# Patient Record
Sex: FEMALE | Race: WHITE | NOT HISPANIC OR LATINO | Employment: OTHER | URBAN - METROPOLITAN AREA
[De-identification: names, ages, dates, MRNs, and addresses within clinical notes are randomized per-mention and may not be internally consistent; named-entity substitution may affect disease eponyms.]

---

## 2018-04-03 ENCOUNTER — OFFICE VISIT - RIVER FALLS (OUTPATIENT)
Dept: FAMILY MEDICINE | Facility: CLINIC | Age: 53
End: 2018-04-03

## 2018-04-25 ENCOUNTER — OFFICE VISIT - RIVER FALLS (OUTPATIENT)
Dept: FAMILY MEDICINE | Facility: CLINIC | Age: 53
End: 2018-04-25

## 2018-04-26 ENCOUNTER — OFFICE VISIT - RIVER FALLS (OUTPATIENT)
Dept: FAMILY MEDICINE | Facility: CLINIC | Age: 53
End: 2018-04-26

## 2018-04-26 ASSESSMENT — MIFFLIN-ST. JEOR: SCORE: 1152.1

## 2018-05-01 LAB
ANA SER QL IA: NEGATIVE
CREAT SERPL-MCNC: 0.66 MG/DL (ref 0.5–1.05)
GLUCOSE BLD-MCNC: 116 MG/DL (ref 65–99)
RHEUMATOID FACT SER NEPH-ACNC: <14 IU/ML

## 2018-09-11 ENCOUNTER — MEDICAL CORRESPONDENCE (OUTPATIENT)
Dept: HEALTH INFORMATION MANAGEMENT | Facility: CLINIC | Age: 53
End: 2018-09-11

## 2018-09-18 ENCOUNTER — TELEPHONE (OUTPATIENT)
Dept: RHEUMATOLOGY | Facility: CLINIC | Age: 53
End: 2018-09-18

## 2018-09-24 ENCOUNTER — HEALTH MAINTENANCE LETTER (OUTPATIENT)
Age: 53
End: 2018-09-24

## 2018-10-25 ENCOUNTER — TELEPHONE (OUTPATIENT)
Dept: RHEUMATOLOGY | Facility: CLINIC | Age: 53
End: 2018-10-25

## 2018-10-25 NOTE — TELEPHONE ENCOUNTER
[facility] Call Center    Phone Message    May a detailed message be left on voicemail: yes    Reason for Call: Other: Patient is returning Josefina's call. Tried to lync she was unavailable advised patient i would have her call her back. Thank you.     Action Taken: Message routed to:  [facility] ([facility] rheum

## 2019-01-22 ENCOUNTER — PATIENT OUTREACH (OUTPATIENT)
Dept: CARE COORDINATION | Facility: CLINIC | Age: 54
End: 2019-01-22

## 2019-02-01 ENCOUNTER — ANCILLARY PROCEDURE (OUTPATIENT)
Dept: GENERAL RADIOLOGY | Facility: CLINIC | Age: 54
End: 2019-02-01
Payer: COMMERCIAL

## 2019-02-01 ENCOUNTER — OFFICE VISIT (OUTPATIENT)
Dept: RHEUMATOLOGY | Facility: CLINIC | Age: 54
End: 2019-02-01
Attending: INTERNAL MEDICINE
Payer: COMMERCIAL

## 2019-02-01 VITALS
DIASTOLIC BLOOD PRESSURE: 80 MMHG | TEMPERATURE: 97.5 F | HEART RATE: 70 BPM | WEIGHT: 137.3 LBS | BODY MASS INDEX: 25.27 KG/M2 | HEIGHT: 62 IN | OXYGEN SATURATION: 99 % | SYSTOLIC BLOOD PRESSURE: 126 MMHG

## 2019-02-01 DIAGNOSIS — M25.50 ARTHRALGIA, UNSPECIFIED JOINT: ICD-10-CM

## 2019-02-01 DIAGNOSIS — M25.50 ARTHRALGIA, UNSPECIFIED JOINT: Primary | ICD-10-CM

## 2019-02-01 LAB
ALBUMIN UR-MCNC: NEGATIVE MG/DL
ALT SERPL W P-5'-P-CCNC: 25 U/L (ref 0–50)
APPEARANCE UR: CLEAR
AST SERPL W P-5'-P-CCNC: 22 U/L (ref 0–45)
BILIRUB UR QL STRIP: NEGATIVE
CK SERPL-CCNC: 54 U/L (ref 30–225)
COLOR UR AUTO: YELLOW
CREAT SERPL-MCNC: 0.6 MG/DL (ref 0.52–1.04)
CRP SERPL-MCNC: 7.2 MG/L (ref 0–8)
ERYTHROCYTE [DISTWIDTH] IN BLOOD BY AUTOMATED COUNT: 12.7 % (ref 10–15)
ERYTHROCYTE [SEDIMENTATION RATE] IN BLOOD BY WESTERGREN METHOD: 10 MM/H (ref 0–30)
GFR SERPL CREATININE-BSD FRML MDRD: >90 ML/MIN/{1.73_M2}
GLUCOSE UR STRIP-MCNC: NEGATIVE MG/DL
HCT VFR BLD AUTO: 42.7 % (ref 35–47)
HGB BLD-MCNC: 13.3 G/DL (ref 11.7–15.7)
HGB UR QL STRIP: NEGATIVE
KETONES UR STRIP-MCNC: 5 MG/DL
LEUKOCYTE ESTERASE UR QL STRIP: NEGATIVE
MCH RBC QN AUTO: 29.5 PG (ref 26.5–33)
MCHC RBC AUTO-ENTMCNC: 31.1 G/DL (ref 31.5–36.5)
MCV RBC AUTO: 95 FL (ref 78–100)
NITRATE UR QL: NEGATIVE
PH UR STRIP: 5 PH (ref 5–7)
PLATELET # BLD AUTO: 324 10E9/L (ref 150–450)
RBC # BLD AUTO: 4.51 10E12/L (ref 3.8–5.2)
RBC #/AREA URNS AUTO: 1 /HPF (ref 0–2)
SOURCE: ABNORMAL
SP GR UR STRIP: 1.01 (ref 1–1.03)
SQUAMOUS #/AREA URNS AUTO: <1 /HPF (ref 0–1)
TSH SERPL DL<=0.005 MIU/L-ACNC: 2.49 MU/L (ref 0.4–4)
UROBILINOGEN UR STRIP-MCNC: 0 MG/DL (ref 0–2)
WBC # BLD AUTO: 7.5 10E9/L (ref 4–11)
WBC #/AREA URNS AUTO: 1 /HPF (ref 0–5)

## 2019-02-01 PROCEDURE — 85027 COMPLETE CBC AUTOMATED: CPT | Performed by: INTERNAL MEDICINE

## 2019-02-01 PROCEDURE — 81001 URINALYSIS AUTO W/SCOPE: CPT | Performed by: INTERNAL MEDICINE

## 2019-02-01 PROCEDURE — 36415 COLL VENOUS BLD VENIPUNCTURE: CPT | Performed by: INTERNAL MEDICINE

## 2019-02-01 PROCEDURE — 82565 ASSAY OF CREATININE: CPT | Performed by: INTERNAL MEDICINE

## 2019-02-01 PROCEDURE — 86038 ANTINUCLEAR ANTIBODIES: CPT | Performed by: INTERNAL MEDICINE

## 2019-02-01 PROCEDURE — 85652 RBC SED RATE AUTOMATED: CPT | Performed by: INTERNAL MEDICINE

## 2019-02-01 PROCEDURE — 84443 ASSAY THYROID STIM HORMONE: CPT | Performed by: INTERNAL MEDICINE

## 2019-02-01 PROCEDURE — G0463 HOSPITAL OUTPT CLINIC VISIT: HCPCS | Mod: ZF

## 2019-02-01 PROCEDURE — 82550 ASSAY OF CK (CPK): CPT | Performed by: INTERNAL MEDICINE

## 2019-02-01 PROCEDURE — 86140 C-REACTIVE PROTEIN: CPT | Performed by: INTERNAL MEDICINE

## 2019-02-01 PROCEDURE — 84450 TRANSFERASE (AST) (SGOT): CPT | Performed by: INTERNAL MEDICINE

## 2019-02-01 PROCEDURE — 84460 ALANINE AMINO (ALT) (SGPT): CPT | Performed by: INTERNAL MEDICINE

## 2019-02-01 RX ORDER — IBUPROFEN 400 MG/1
TABLET, FILM COATED ORAL
COMMUNITY
Start: 2018-02-08

## 2019-02-01 RX ORDER — PREDNISONE 5 MG/1
TABLET ORAL
Qty: 60 TABLET | Refills: 2 | Status: SHIPPED | OUTPATIENT
Start: 2019-02-01 | End: 2023-12-06

## 2019-02-01 ASSESSMENT — MIFFLIN-ST. JEOR: SCORE: 1178.1

## 2019-02-01 ASSESSMENT — PAIN SCALES - GENERAL: PAINLEVEL: SEVERE PAIN (7)
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
## 2019-02-01 NOTE — PATIENT INSTRUCTIONS
"Dx: inflammatory arthritis, with questionable contribution of \"wear and tear\" arthritis.    Plan: xrays and bloodwork  1. Prednisone course for 3 weeks  2. Start methotrexate once a week.    Return in 3 months.  "

## 2019-02-01 NOTE — NURSING NOTE
"Chief Complaint   Patient presents with     Consult     2nd opinion for arthralgia     /80   Pulse 70   Temp 97.5  F (36.4  C) (Oral)   Ht 1.562 m (5' 1.5\")   Wt 62.3 kg (137 lb 4.8 oz)   SpO2 99%   BMI 25.52 kg/m    Theresa Coronado CMA    "

## 2019-02-04 LAB — ANA SER QL IF: NEGATIVE

## 2019-03-15 ENCOUNTER — TELEPHONE (OUTPATIENT)
Dept: RHEUMATOLOGY | Facility: CLINIC | Age: 54
End: 2019-03-15

## 2019-03-15 NOTE — TELEPHONE ENCOUNTER
[facility] Call Center    Phone Message    May a detailed message be left on voicemail: yes    Reason for Call: Other: Marquita Ware [phone], fax [phone],states that there was a referral that was sent with out a reason, she also states that labs and recs need to be sent  to support the referral     Action Taken: Message routed to:  [facility] ([facility] Tang kaba

## 2019-03-15 NOTE — TELEPHONE ENCOUNTER
[facility] Call Center    Phone Message    May a detailed message be left on voicemail: yes    Reason for Call: Other: Marquita:  [facility] calling stating they received additional information on pt, they are asking what the reason is and why the pt was referred. Questions please call Marquita at [phone]    Action Taken: Message routed to:  [facility] ([facility]): RHEUM

## 2019-03-15 NOTE — TELEPHONE ENCOUNTER
Faxed additional information requested to   [phone].    Mercy Cortes MSN, RN  Rheumatology RN Care Coordinator   K[facility]

## 2019-03-18 NOTE — TELEPHONE ENCOUNTER
Marquita called back.  I provided her the DX for referral but she needs all labs and xrays related to this DX faxed asap.  She is also requesting clinic notes be faxed over as well.

## 2019-03-18 NOTE — TELEPHONE ENCOUNTER
Returned call to [facility] Endocr[facility]ology clinic. 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 Was asked who is managing this now, and was not able to answer. Also was asked who the pt's primary care provider was and we have it listed as Emery Trotter MD-rheumatologist at . In looking in care everywhere it has Ladi Marvin MD listed.

## 2019-03-19 NOTE — TELEPHONE ENCOUNTER
Left message on pt's voice mail asking that she return call to discuss the reason she is requesting the referral to Endocrinology at [facility]. They are wanting to know the reason for the referral and to get more information.    RAY WildN RN  Rheumatology RN Coordinator  [facility]

## 2019-03-20 NOTE — TELEPHONE ENCOUNTER
"Pt returned call. She had understood that when [facility] declined to see her, they had recommended the endocrine clinic for follow up. She denies having diabetes or any other issue that would be seen by endocrine. Katarina thought it was for her joint pain. She is agreeable that she does not need to be seen there.    Pt did bring up concerns about medications used by rheumatology. Doesn't want to be \"trialed\" on meds. We discussed that the medications we use don't always work the same on everyone and that we do need to \"trial\" them to see if they will be effective for the pt. Pt reported she is taking a very dangerous drug that was ordered by Dr Dhillon, methotrexate. Informed pt that this is relatively safe at the dose we give pts. We also do regular labs to monitor for any potential side effects. Pt is taking OTC vitamins for Folic Acid. Asked pt to call in with any mouth or lip sores and then we can discuss the prescription strength folic acid.    Pt reports she is feeling better about the methotrexate. She will call with any questions about her medications. Discontinued referral to [facility] Endocr[facility]ology.    VARUN Wild RN  Rheumatology RN Coordinator  [facility]    "

## 2019-05-03 ENCOUNTER — OFFICE VISIT (OUTPATIENT)
Dept: RHEUMATOLOGY | Facility: CLINIC | Age: 54
End: 2019-05-03
Attending: INTERNAL MEDICINE
Payer: COMMERCIAL

## 2019-05-03 VITALS
BODY MASS INDEX: 25.38 KG/M2 | SYSTOLIC BLOOD PRESSURE: 127 MMHG | WEIGHT: 137.9 LBS | HEIGHT: 62 IN | OXYGEN SATURATION: 100 % | DIASTOLIC BLOOD PRESSURE: 76 MMHG | HEART RATE: 63 BPM | TEMPERATURE: 97.5 F

## 2019-05-03 DIAGNOSIS — Z79.631 LONG TERM METHOTREXATE USER: ICD-10-CM

## 2019-05-03 DIAGNOSIS — Z79.52 LONG TERM CURRENT USE OF SYSTEMIC STEROIDS: ICD-10-CM

## 2019-05-03 DIAGNOSIS — M25.50 ARTHRALGIA, UNSPECIFIED JOINT: Primary | ICD-10-CM

## 2019-05-03 LAB
ALT SERPL W P-5'-P-CCNC: 27 U/L (ref 0–50)
AST SERPL W P-5'-P-CCNC: 29 U/L (ref 0–45)
CREAT SERPL-MCNC: 0.64 MG/DL (ref 0.52–1.04)
ERYTHROCYTE [DISTWIDTH] IN BLOOD BY AUTOMATED COUNT: 13.4 % (ref 10–15)
GFR SERPL CREATININE-BSD FRML MDRD: >90 ML/MIN/{1.73_M2}
HCT VFR BLD AUTO: 41.6 % (ref 35–47)
HGB BLD-MCNC: 13.3 G/DL (ref 11.7–15.7)
MCH RBC QN AUTO: 31.7 PG (ref 26.5–33)
MCHC RBC AUTO-ENTMCNC: 32 G/DL (ref 31.5–36.5)
MCV RBC AUTO: 99 FL (ref 78–100)
PLATELET # BLD AUTO: 270 10E9/L (ref 150–450)
RBC # BLD AUTO: 4.19 10E12/L (ref 3.8–5.2)
WBC # BLD AUTO: 7.6 10E9/L (ref 4–11)

## 2019-05-03 PROCEDURE — 82565 ASSAY OF CREATININE: CPT | Performed by: INTERNAL MEDICINE

## 2019-05-03 PROCEDURE — G0463 HOSPITAL OUTPT CLINIC VISIT: HCPCS | Mod: ZF

## 2019-05-03 PROCEDURE — 36415 COLL VENOUS BLD VENIPUNCTURE: CPT | Performed by: INTERNAL MEDICINE

## 2019-05-03 PROCEDURE — 84460 ALANINE AMINO (ALT) (SGPT): CPT | Performed by: INTERNAL MEDICINE

## 2019-05-03 PROCEDURE — 85027 COMPLETE CBC AUTOMATED: CPT | Performed by: INTERNAL MEDICINE

## 2019-05-03 PROCEDURE — 84450 TRANSFERASE (AST) (SGOT): CPT | Performed by: INTERNAL MEDICINE

## 2019-05-03 RX ORDER — COVID-19 ANTIGEN TEST
220 KIT MISCELLANEOUS DAILY
COMMUNITY
End: 2023-12-06

## 2019-05-03 ASSESSMENT — MIFFLIN-ST. JEOR: SCORE: 1180.82

## 2019-05-03 ASSESSMENT — PAIN SCALES - GENERAL: PAINLEVEL: MODERATE PAIN (4)

## 2019-05-03 NOTE — PROGRESS NOTES
[facility]  Rheumatology Clinic  Bhaskar Dhillon MD  2019     Name: Katarina Sebastian  MRN: [id number]  Age: 53 year old  : 1965  Referring provider: Pete Trotter     Assessment and Plan:  # Polymyalgia rheumatica:    Patient relates significant improvement in shoulder and hip girdle arthralgia/myalgia since institution of prednisone and methotrexate in 2019. Exam shows left shoulder flexion limited to 150  and mild impingement sings on the left. Hip range of motion is normal. Labs from 2019 show normal kidney function, bone marrow function, blood counts, liver function, and thyroid function. Sed rate, CRP, and CK were within normal limits. Urine was clear. Shoulder films from 2019 show cartilage preservation, no fractures, and no bony changes. Left hip films show mild degenerative change.     Polymyalgia rheumatica is suppressed. There are no symptoms or signs of associated giant cell arteritis. Inflammatory markers were normal in 2019. We had a good discussion about the 50 percent likelihood that symptoms would reoccur now that the patient has tapered off prednisone entirely as of May 2019. I recommend that she continue methotrexate as a steroid sparing agent. She should continue 15 mg weekly. If she is concerned that neck and chest flushing and redness is due to methotrexate, she may make a 1 week trial of discontinuing the drug and restarting it using split doses if she judges that there is a causative relationship between the drug and the symptoms. While on the drug, she should limit alcohol intake to 2 drinks weekly and every 3 month liver function testing should be performed. I asked her to contact our office if she has recurrent shoulder/hip girdle symptoms or if she experiences visual changes or persistent headache.     - Creatinine  - ALT  - AST  - CBC with platelets    Follow-up: 4 months     HPI:   Katarina Sebastian is a 53 year old right hand dominant female  with a history of with a history of diffuse arthralgias who presents for follow up. She was last seen on 02/01/2019, at which time she reported chronic slowly progressive, symmetric, exclusively large joint polyarthralgia. Differential diagnosis included seronegative rheumatoid arthritis (atypical due to complete lack of small joint involvement), spondyloarthropathy (albeit with no obvious axial involvement), polymyalgia rheumatica, and degenerative joint disease of the shoulders and hips associated with soft-tissue rheumatism (e.g. rotator cuff pathology). Plan was to obtain plain films of the hips and shoulders and renew inflammatory markers and organ function studies. She was restarted on low dose prednisone taper and methotrexate 15 mg weekly for a working diagnosis of seronegative inflammatory arthritis.      Today the patient reports that her hip pain is improved but she continues to have pain with use of the shoulders. She has been working many hours as a  recently. It is more comfortable to hold the arm elevated. She does have stiffness after prolonged inactivity.     She has been taking methotrexate 15 mg once weekly with food and notes that she feels hot and flushed on her neck about 15 minutes after taking methotrexate. This persists intermittently throughout the week. She denies itching, shortness of breath, wheezing or throat tightness. She also reports a mild headache associated with dosing methotrexate. She also reports blurred vision and has had to wear her reading glasses more often. She has also noticed that her right eye is swollen. She does not typically get allergies. She completed her prednisone taper this past week, which she was tapering by 1 mg each week. She did find the prednisone helpful symptomatically. Additionally, she reports that she has been using naproxen once daily and has continued seeing the chiropractor.     Background history:  She reports an onset of joint pain that  began in 2/2017. She first noticed these symptoms when it became increasingly more difficult for her to get out of bed in the mornings. She initially took ibuprofen to alleviate her pain, which provided her mild relief until about noon. She was initially seen by her primary care physician and was tested twice for Lyme's disease, which was negative both times. Patient was evaluated by Dr. Trotter on 5/31/18 for diffuse arthralgias. Exam showed no inflammatory joint changes. Laboratory workup was unremarkable with the exception of borderline positive JACKI, mildly elevated sedimentation rate and C reactive protein, and a vitamin D level of 25 (lower than normal 30). Patient was given a course of low dose prednisone for 2 weeks. During a follow-up telephone encounter on 6/11/18, the patient had a significant positive response to prednisone. Dr. Trotter responded by prescribing hydroxychloroquine empirically. The patient did not start plaquenil because the co-pay was too high. Dr. Trotter then prescribed a trial of sulfasalazine, which she also did not start.     Review of Systems:   Pertinent items are noted in HPI or as below, remainder of complete ROS is negative.      No recent problems with hearing. No swallowing problems.   No breathing difficulty, shortness of breath, coughing, or wheezing  No chest pain or palpitations  No heart burn, indigestion, abdominal pain, nausea, vomiting, diarrhea  No urination problems, no bloody, cloudy urine, no dysuria  No numbing, tingling, weakness  No confusion  No easy bleeding or bruising.     Active Medications:     Current Outpatient Medications:      Ergocalciferol (VITAMIN D2 PO), , Disp: , Rfl:      methotrexate 2.5 MG tablet, Take 2 tabs once. Then after 1 week, take 6 tabs every week. Labs every 10-12 weeks for refills., Disp: 24 tablet, Rfl: 6     Misc Natural Products (GLUCOSAMINE CHOND COMPLEX/MSM PO), Take 2 tablets by mouth, Disp: , Rfl:      Multiple Vitamins-Minerals  "(CENTRUM ADULTS PO), , Disp: , Rfl:      naproxen sodium (ALEVE) 220 MG capsule, Take 220 mg by mouth daily, Disp: , Rfl:      Omega-3 Fatty Acids (SUPER OMEGA 3 EPA/DHA) 1000 MG CAPS, , Disp: , Rfl:      vitamin D3 (CHOLECALCIFEROL) 56771 units (250 mcg) capsule, , Disp: , Rfl:      ibuprofen (ADVIL/MOTRIN) 400 MG tablet, , Disp: , Rfl:      predniSONE (DELTASONE) 5 MG tablet, 3 tabs daily for 7 d, then 2 tabs daily for 7 d, then 1 tab daily for 7 d. (Patient not taking: Reported on 5/3/2019.), Disp: 60 tablet, Rfl: 2      Allergies:   Hay fever & [a.r.m.]       Past Medical History:  Cerebral aneurism; 2011  Shingles outbreak   Hallux valgus diagnosed in 2015  Arthralgia/myalgia Prednisone-sensitive, diagnosed as PMR in 2-19. Improved with low dose prednisone, tapered off in 5-19. Methotrexate started in 2-19.       Past Surgical History:  Carpal tunnel release RT/LT; 1990  Pelvis laparoscopy; 1998     Family History:   Mother: Hypertension, arthritis, lipids  Father: Possible parkinson's  Maternal grandmother: Osteoporosis, alcohol/drug, breast cancer  Maternal grandfather: alcohol/drug  Brother: Asthma  No history of autoimmune disorders such as lupus or rheumatoid arthritis.      Social History:   The patient denies tobacco or alcohol use.  Patient works as a , works about 40+ hours/week.  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     Physical Exam:   /76   Pulse 63   Temp 97.5  F (36.4  C) (Oral)   Ht 1.562 m (5' 1.5\")   Wt 62.6 kg (137 lb 14.4 oz)   SpO2 100%   BMI 25.63 kg/m     Wt Readings from Last 4 Encounters:   05/03/19 62.6 kg (137 lb 14.4 oz)   02/01/19 62.3 kg (137 lb 4.8 oz)   09/03/09 54.4 kg (120 lb)     Constitutional: Well-developed, appearing stated age; cooperative  Eyes: Normal EOM, PERRLA, vision, conjunctiva, sclera  ENT: Normal external ears, nose, hearing, lips, teeth, gums, throat. No mucous membrane lesions, normal saliva pool  Neck: No mass or thyroid enlargement  Resp: " Lungs clear to auscultation, nl to palpation  CV: RRR, no murmurs, rubs or gallops, no edema  GI: No ABD mass or tenderness, no HSM  : Not tested  Lymph: No cervical, supraclavicular, inguinal or epitrochlear nodes  MS: Left shoulder slexion limited to about 150 . Passive internal and external rotation are intact. Neer's sign and Hawkin's sign are both positive. Hip range of motion is excellent.   Skin: No nail pitting, alopecia, rash, nodules or lesions  Neuro: Normal cranial nerves, strength, sensation, DTRs.   Psych: Normal judgement, orientation, memory, affect.     Imaging:   XR bilateral shoulder 3 views 02/01/2019:  1. No acute osseous abnormality.  2. Mild to moderate degenerative changes of the acromioclavicular  joints, greater on left.  3. No radiographic evidence for inflammatory arthropathy.  Per radiology.     XR hip bilateral 2 views 02/01/2019:  1. No acute osseous abnormality.  2. No substantial degenerative change.  Per radiology.      Laboratory:   RHEUM RESULTS Latest Ref Rng & Units 2/1/2019 5/3/2019   SED RATE 0 - 30 mm/h 10 -   CRP, INFLAMMATION 0.0 - 8.0 mg/L 7.2 -   CK TOTAL 30 - 225 U/L 54 -   AST 0 - 45 U/L 22 29   ALT 0 - 50 U/L 25 27   WBC 4.0 - 11.0 10e9/L 7.5 7.6   RBC 3.8 - 5.2 10e12/L 4.51 4.19   HGB 11.7 - 15.7 g/dL 13.3 13.3   HCT 35.0 - 47.0 % 42.7 41.6   MCV 78 - 100 fl 95 99   MCHC 31.5 - 36.5 g/dL 31.1(L) 32.0   RDW 10.0 - 15.0 % 12.7 13.4    - 450 10e9/L 324 270   CREATININE 0.52 - 1.04 mg/dL 0.60 0.64   GFR ESTIMATE, IF BLACK >60 mL/min/[1.73:m2] >90 >90   GFR ESTIMATE >60 mL/min/[1.73:m2] >90 >90       JACKI interpretation   Date Value Ref Range Status   02/01/2019 Negative NEG^Negative Final     Comment:                                        Reference range:  <1:40  NEGATIVE  1:40 - 1:80  BORDERLINE POSITIVE  >1:80 POSITIVE       Scribe Disclosure:  I, Amirah Ramires, am serving as a scribe to document services personally performed by Bhaskar Dhillon MD at this  visit, based upon the provider's statements to me. All documentation has been reviewed by the aforementioned provider prior to being entered into the official medical record.

## 2019-05-03 NOTE — LETTER
5/3/2019      RE: Katarina Sebastian   [address] [place] [id number]       [facility]  Rheumatology Clinic  Bhaskar Dhillon MD  2019     Name: Katarina Sebastian  MRN: [id number]  Age: 53 year old  : 1965  Referring provider: Pete Trotter     Assessment and Plan:  # Polymyalgia rheumatica:    Patient relates significant improvement in shoulder and hip girdle arthralgia/myalgia since institution of prednisone and methotrexate in 2019. Exam shows left shoulder flexion limited to 150  and mild impingement sings on the left. Hip range of motion is normal. Labs from 2019 show normal kidney function, bone marrow function, blood counts, liver function, and thyroid function. Sed rate, CRP, and CK were within normal limits. Urine was clear. Shoulder films from 2019 show cartilage preservation, no fractures, and no bony changes. Left hip films show mild degenerative change.     Polymyalgia rheumatica is suppressed. There are no symptoms or signs of associated giant cell arteritis. Inflammatory markers were normal in 2019. We had a good discussion about the 50 percent likelihood that symptoms would reoccur now that the patient has tapered off prednisone entirely as of May 2019. I recommend that she continue methotrexate as a steroid sparing agent. She should continue 15 mg weekly. If she is concerned that neck and chest flushing and redness is due to methotrexate, she may make a 1 week trial of discontinuing the drug and restarting it using split doses if she judges that there is a causative relationship between the drug and the symptoms. While on the drug, she should limit alcohol intake to 2 drinks weekly and every 3 month liver function testing should be performed. I asked her to contact our office if she has recurrent shoulder/hip girdle symptoms or if she experiences visual changes or persistent headache.     - Creatinine  - ALT  - AST  - CBC with  platelets    Follow-up: 4 months     HPI:   Katarina Sebastian is a 53 year old right hand dominant female with a history of with a history of diffuse arthralgias who presents for follow up. She was last seen on 02/01/2019, at which time she reported chronic slowly progressive, symmetric, exclusively large joint polyarthralgia. Differential diagnosis included seronegative rheumatoid arthritis (atypical due to complete lack of small joint involvement), spondyloarthropathy (albeit with no obvious axial involvement), polymyalgia rheumatica, and degenerative joint disease of the shoulders and hips associated with soft-tissue rheumatism (e.g. rotator cuff pathology). Plan was to obtain plain films of the hips and shoulders and renew inflammatory markers and organ function studies. She was restarted on low dose prednisone taper and methotrexate 15 mg weekly for a working diagnosis of seronegative inflammatory arthritis.      Today the patient reports that her hip pain is improved but she continues to have pain with use of the shoulders. She has been working many hours as a  recently. It is more comfortable to hold the arm elevated. She does have stiffness after prolonged inactivity.     She has been taking methotrexate 15 mg once weekly with food and notes that she feels hot and flushed on her neck about 15 minutes after taking methotrexate. This persists intermittently throughout the week. She denies itching, shortness of breath, wheezing or throat tightness. She also reports a mild headache associated with dosing methotrexate. She also reports blurred vision and has had to wear her reading glasses more often. She has also noticed that her right eye is swollen. She does not typically get allergies. She completed her prednisone taper this past week, which she was tapering by 1 mg each week. She did find the prednisone helpful symptomatically. Additionally, she reports that she has been using naproxen once daily  and has continued seeing the chiropractor.     Background history:  She reports an onset of joint pain that began in 2/2017. She first noticed these symptoms when it became increasingly more difficult for her to get out of bed in the mornings. She initially took ibuprofen to alleviate her pain, which provided her mild relief until about noon. She was initially seen by her primary care physician and was tested twice for Lyme's disease, which was negative both times. Patient was evaluated by Dr. Trotter on 5/31/18 for diffuse arthralgias. Exam showed no inflammatory joint changes. Laboratory workup was unremarkable with the exception of borderline positive JACKI, mildly elevated sedimentation rate and C reactive protein, and a vitamin D level of 25 (lower than normal 30). Patient was given a course of low dose prednisone for 2 weeks. During a follow-up telephone encounter on 6/11/18, the patient had a significant positive response to prednisone. Dr. Trotter responded by prescribing hydroxychloroquine empirically. The patient did not start plaquenil because the co-pay was too high. Dr. Trotter then prescribed a trial of sulfasalazine, which she also did not start.     Review of Systems:   Pertinent items are noted in HPI or as below, remainder of complete ROS is negative.      No recent problems with hearing. No swallowing problems.   No breathing difficulty, shortness of breath, coughing, or wheezing  No chest pain or palpitations  No heart burn, indigestion, abdominal pain, nausea, vomiting, diarrhea  No urination problems, no bloody, cloudy urine, no dysuria  No numbing, tingling, weakness  No confusion  No easy bleeding or bruising.     Active Medications:     Current Outpatient Medications:      Ergocalciferol (VITAMIN D2 PO), , Disp: , Rfl:      methotrexate 2.5 MG tablet, Take 2 tabs once. Then after 1 week, take 6 tabs every week. Labs every 10-12 weeks for refills., Disp: 24 tablet, Rfl: 6     Misc Natural Products  "(GLUCOSAMINE CHOND COMPLEX/MSM PO), Take 2 tablets by mouth, Disp: , Rfl:      Multiple Vitamins-Minerals (CENTRUM ADULTS PO), , Disp: , Rfl:      naproxen sodium (ALEVE) 220 MG capsule, Take 220 mg by mouth daily, Disp: , Rfl:      Omega-3 Fatty Acids (SUPER OMEGA 3 EPA/DHA) 1000 MG CAPS, , Disp: , Rfl:      vitamin D3 (CHOLECALCIFEROL) 72601 units (250 mcg) capsule, , Disp: , Rfl:      ibuprofen (ADVIL/MOTRIN) 400 MG tablet, , Disp: , Rfl:      predniSONE (DELTASONE) 5 MG tablet, 3 tabs daily for 7 d, then 2 tabs daily for 7 d, then 1 tab daily for 7 d. (Patient not taking: Reported on 5/3/2019.), Disp: 60 tablet, Rfl: 2      Allergies:   Hay fever & [a.r.m.]       Past Medical History:  Cerebral aneurism; 2011  Shingles outbreak   Hallux valgus diagnosed in 2015  Arthralgia/myalgia Prednisone-sensitive, diagnosed as PMR in 2-19. Improved with low dose prednisone, tapered off in 5-19. Methotrexate started in 2-19.       Past Surgical History:  Carpal tunnel release RT/LT; 1990  Pelvis laparoscopy; 1998     Family History:   Mother: Hypertension, arthritis, lipids  Father: Possible parkinson's  Maternal grandmother: Osteoporosis, alcohol/drug, breast cancer  Maternal grandfather: alcohol/drug  Brother: Asthma  No history of autoimmune disorders such as lupus or rheumatoid arthritis.      Social History:   The patient denies tobacco or alcohol use.  Patient works as a , works about 40+ hours/week.  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     Physical Exam:   /76   Pulse 63   Temp 97.5  F (36.4  C) (Oral)   Ht 1.562 m (5' 1.5\")   Wt 62.6 kg (137 lb 14.4 oz)   SpO2 100%   BMI 25.63 kg/m      Wt Readings from Last 4 Encounters:   05/03/19 62.6 kg (137 lb 14.4 oz)   02/01/19 62.3 kg (137 lb 4.8 oz)   09/03/09 54.4 kg (120 lb)     Constitutional: Well-developed, appearing stated age; cooperative  Eyes: Normal EOM, PERRLA, vision, conjunctiva, sclera  ENT: Normal external ears, nose, hearing, lips, teeth, " gums, throat. No mucous membrane lesions, normal saliva pool  Neck: No mass or thyroid enlargement  Resp: Lungs clear to auscultation, nl to palpation  CV: RRR, no murmurs, rubs or gallops, no edema  GI: No ABD mass or tenderness, no HSM  : Not tested  Lymph: No cervical, supraclavicular, inguinal or epitrochlear nodes  MS: Left shoulder slexion limited to about 150 . Passive internal and external rotation are intact. Neer's sign and Hawkin's sign are both positive. Hip range of motion is excellent.   Skin: No nail pitting, alopecia, rash, nodules or lesions  Neuro: Normal cranial nerves, strength, sensation, DTRs.   Psych: Normal judgement, orientation, memory, affect.     Imaging:   XR bilateral shoulder 3 views 02/01/2019:  1. No acute osseous abnormality.  2. Mild to moderate degenerative changes of the acromioclavicular  joints, greater on left.  3. No radiographic evidence for inflammatory arthropathy.  Per radiology.     XR hip bilateral 2 views 02/01/2019:  1. No acute osseous abnormality.  2. No substantial degenerative change.  Per radiology.      Laboratory:   RHEUM RESULTS Latest Ref Rng & Units 2/1/2019 5/3/2019   SED RATE 0 - 30 mm/h 10 -   CRP, INFLAMMATION 0.0 - 8.0 mg/L 7.2 -   CK TOTAL 30 - 225 U/L 54 -   AST 0 - 45 U/L 22 29   ALT 0 - 50 U/L 25 27   WBC 4.0 - 11.0 10e9/L 7.5 7.6   RBC 3.8 - 5.2 10e12/L 4.51 4.19   HGB 11.7 - 15.7 g/dL 13.3 13.3   HCT 35.0 - 47.0 % 42.7 41.6   MCV 78 - 100 fl 95 99   MCHC 31.5 - 36.5 g/dL 31.1(L) 32.0   RDW 10.0 - 15.0 % 12.7 13.4    - 450 10e9/L 324 270   CREATININE 0.52 - 1.04 mg/dL 0.60 0.64   GFR ESTIMATE, IF BLACK >60 mL/min/[1.73:m2] >90 >90   GFR ESTIMATE >60 mL/min/[1.73:m2] >90 >90       JACKI interpretation   Date Value Ref Range Status   02/01/2019 Negative NEG^Negative Final     Comment:                                        Reference range:  <1:40  NEGATIVE  1:40 - 1:80  BORDERLINE POSITIVE  >1:80 POSITIVE       Scribe Disclosure:  Amirah LOGAN  Ike, am serving as a scribe to document services personally performed by Bhaskar Dhillon MD at this visit, based upon the provider's statements to me. All documentation has been reviewed by the aforementioned provider prior to being entered into the official medical record.      Bhaskar Dhillon MD

## 2019-05-03 NOTE — PATIENT INSTRUCTIONS
Diagnosis: polymyalgia rheumatica, rotator cuff tendonitis    Plan:   1. Continue methotrexate 15 mg weekly. Try a drug holiday to determine if flushing symptoms are related to dosing. If symptoms are related to dosing, 3 tablets can be taken twice daily once per week.   2. Prednisone can be restarted for flare of pain, not associated with activity  3. Continue shoulder range of motion exercises, such as wall walking exercises 10 times twice daily for the right shoulder  4. Follow-up with ophthalmology regarding blurry vision. Monitor for other vision changes and vision loss     While on methotrexate:   --Repeat labs every 3 months (AST/ALT, Albumin, CBC with platelets)   --Limit alcohol intake to 2 drinks weekly; use folate 1 mg daily.  --Tylenol 500 mg up to three times daily as needed can be used for nausea/ headache associated with dosing.  -- consider splitting methotrexate dose (3 tabs once, then 3 tabs 12 hours later)

## 2019-08-14 ENCOUNTER — DOCUMENTATION ONLY (OUTPATIENT)
Dept: CARE COORDINATION | Facility: CLINIC | Age: 54
End: 2019-08-14

## 2019-09-12 NOTE — PROGRESS NOTES
[facility]  Rheumatology Clinic  Bhaskar Dhillon MD  2019     Name: Katarina Sebastian  MRN: [id number]  Age: 53 year old  : 1965  Referring provider: Pete Trotter    Assessment and Plan:  # Polymyalgia rheumatica:  Patient relates no recurrence of former neck, shoulder, and hip girdle myalgias. Exam is benign. Blood work from May 3rd showed creatinine, LFTs, and CBC all normal.    Polymyalgia rheumatica is quiescent, now more than six months since starting methotrexate as a steroid-sparing agent. I recommend reducing methotrexate to 7.5 mg once weekly. If after three months she has no recurrence of symptoms, she may discontinue methotrexate altogether. I will recheck LFTs, CBC, creatinine, and CRP today and return to clinic in six months.     # Methotrexate-associated chest and neck eruption:  Transient nature of red/warm skin on the day of methotrexate dosing may represent a drug reaction, but I do not think that it represents progressive hypersensitivity or allergy. Exam shows neck and chest skin with previous solar damage; there is no erythema now 48 hours after methotrexate dose. Plan to monitor this symptom.     Orders:  - Creatinine  - CRP inflammation  - AST  - ALT  - CBC with platelets  - methotrexate 2.5 MG tablet  Dispense: 12 tablet; Refill: 6    Follow-up: 6 months. If she has any problems stopped methotrexate at the end of October, she will call to let us know.    HPI:   Katarina Sebastian is a right-hand dominant 53 year old female with a history including polymyalgia rheumatica who presents for follow-up. I last evaluated the patient on 2019, at which time I thought the diagnosis of polymyalgia rheumatica was quiescent and the patient related significant improvement in shoulder and hip girdle arthralgia/myalgia since institution of prednisone and methotrexate in 2019. I recommended that she continue methotrexate 15 mg weekly as a steroid sparing agent.     Today,  the patient reports that she is feeling better. She states that her pain waxes and wanes and is usually located in both upper arms. Sitting for prolonged periods such as driving causes stiffness in her upper legs, but she is able to get rid of most of the discomfort in a few minutes. She has not noticed visible swelling in her joints, but she does note that her joints sometimes get warm. She takes Naproxen twice daily and sometimes a third dose at night. She has not been awakening at night due to pain and her morning stiffness usually takes a few minutes to dissipate.     She reports that, after taking methotrexate, she gets very hot and she has an intermittent redness on her chest and neck that usually resolves after one day, but she does sometimes have decreased discomfort and redness the following day. She states that she has been taking methotrexate once weekly and lowered her dosage to 3 tablets weekly around three weeks ago. In the three weeks she's taken the lower dose, she has not felt much of a difference except for a small increase in joint stiffness.     She notes that she discontinued prednisone and does not wish to take this in the future. She believes that prednisone may have affected her vision negatively. She reports that she has been using her reading glasses more frequently.     The patient states that if she has been on her knees for a prolonged period and she is to stand, she feels warm and flushed. She also notes that someone landed on her left thumb during cross-country skiing in 9th grade and she has since had a cracking sensation in her left CMC joint.     Background history:  She reports an onset of joint pain that began in 2/2017. She first noticed these symptoms when it became increasingly more difficult for her to get out of bed in the mornings. She initially took ibuprofen to alleviate her pain, which provided her mild relief until about noon. She was initially seen by her primary care  physician and was tested twice for Lyme's disease, which was negative both times. Patient was evaluated by Dr. Trotter on 5/31/18 for diffuse arthralgias. Exam showed no inflammatory joint changes. Laboratory workup was unremarkable with the exception of borderline positive JACKI, mildly elevated sedimentation rate and C reactive protein, and a vitamin D level of 25 (lower than normal 30). Patient was given a course of low dose prednisone for 2 weeks. During a follow-up telephone encounter on 6/11/18, the patient had a significant positive response to prednisone. Dr. Trotter responded by prescribing hydroxychloroquine empirically. The patient did not start plaquenil because the co-pay was too high. Dr. Trotter then prescribed a trial of sulfasalazine, which she also did not start.      Review of Systems:   Pertinent items are noted in HPI or as below, remainder of complete ROS is negative.      No recent problems with hearing. No swallowing problems.   No breathing difficulty, shortness of breath, coughing, or wheezing  No chest pain or palpitations  No heart burn, indigestion, abdominal pain, nausea, vomiting, diarrhea  No urination problems, no bloody, cloudy urine, no dysuria  No numbing, tingling, weakness  No headaches or confusion  No rashes. No easy bleeding or bruising.     Active Medications:      Multiple Vitamins-Minerals (CENTRUM ADULTS PO), , Disp: , Rfl:      naproxen sodium (ALEVE) 220 MG capsule, Take 220 mg by mouth daily, Disp: , Rfl:      Ergocalciferol (VITAMIN D2 PO), , Disp: , Rfl:      ibuprofen (ADVIL/MOTRIN) 400 MG tablet, , Disp: , Rfl:      methotrexate 2.5 MG tablet, Take 2 tabs once. Then after 1 week, take 6 tabs every week. Labs every 10-12 weeks for refills., Disp: 24 tablet, Rfl: 6     Misc Natural Products (GLUCOSAMINE CHOND COMPLEX/MSM PO), Take 2 tablets by mouth, Disp: , Rfl:      Omega-3 Fatty Acids (SUPER OMEGA 3 EPA/DHA) 1000 MG CAPS, , Disp: , Rfl:      predniSONE (DELTASONE) 5 MG  "tablet, 3 tabs daily for 7 d, then 2 tabs daily for 7 d, then 1 tab daily for 7 d. (Patient not taking: Reported on 5/3/2019.), Disp: 60 tablet, Rfl: 2     vitamin D3 (CHOLECALCIFEROL) 29646 units (250 mcg) capsule, , Disp: , Rfl:      Allergies:   Hay fever   Codeine  Latex    Past Medical History:  Cerebral aneurism; 2011  Shingles outbreak   Hallux valgus diagnosed in 2015  Arthralgia/myalgia Prednisone-sensitive, diagnosed as PMR in 2-19. Improved with low dose prednisone, tapered off in 5-19. Methotrexate started in 2-19.    Past Surgical History:  Carpal tunnel release, bilateral 1990  Pelvis laparoscopy to rule out endometriosis 1998    Family History:    The patient's family history includes Alcohol/Drug in her maternal grandfather and paternal grandfather; Arthritis in her mother; Asthma in her brother and another family member; Breast Cancer in her paternal grandmother; Diabetes in an other family member; Hypertension in her mother; Lipids in her mother; Neurologic Disorder in her father; Osteoporosis in her maternal grandmother.    Social History:  The patient reports that she has quit smoking. She has never used smokeless tobacco. She drinks about two light beers per day. The patient works about 40 hours per week as a . She owns horses that she tends to daily.   PCP: Ladi Marvin  Marital Status:      Physical Exam:   /76   Pulse 71   Temp 97.7  F (36.5  C) (Oral)   Ht 1.562 m (5' 1.5\")   Wt 58.8 kg (129 lb 9.6 oz)   SpO2 100%   BMI 24.09 kg/m     Wt Readings from Last 4 Encounters:   09/13/19 58.8 kg (129 lb 9.6 oz)   05/03/19 62.6 kg (137 lb 14.4 oz)   02/01/19 62.3 kg (137 lb 4.8 oz)   09/03/09 54.4 kg (120 lb)     Constitutional: Well-developed, appearing stated age; cooperative  Eyes: Normal EOM, PERRLA, vision, conjunctiva, sclera  ENT: Normal external ears, nose, hearing, lips, teeth, gums, throat. No mucous membrane lesions, normal saliva pool  Neck: No mass or " thyroid enlargement  Resp: Lungs clear to auscultation, nl to palpation  CV: RRR, no murmurs, rubs or gallops, no edema  GI: No ABD mass or tenderness, no HSM  : Not tested  Lymph: No cervical, supraclavicular, inguinal or epitrochlear nodes  MS: The TMJ, neck, shoulder, elbow, wrist, spine, hip, and foot MTP/IP joints were examined and found normal. No active synovitis or altered joint anatomy. Full joint ROM. Normal  strength. No dactylitis, tenosynovitis, enthesopathy. There are some mild changes of osteoarthritis in the DIPs, PIPs, and the left thumb. The knees are cool. No medial joint line tenderness. Ankle range of motion is full and painless. No effusion at the ankles.   Skin: No nail pitting, alopecia, rash, nodules or lesions  Neuro: Normal cranial nerves, strength, sensation, DTRs.   Psych: Normal judgement, orientation, memory, affect.     Laboratory:   RHEUM RESULTS Latest Ref Rng & Units 2/1/2019 5/3/2019   SED RATE 0 - 30 mm/h 10 -   CRP, INFLAMMATION 0.0 - 8.0 mg/L 7.2 -   CK TOTAL 30 - 225 U/L 54 -   AST 0 - 45 U/L 22 29   ALT 0 - 50 U/L 25 27   WBC 4.0 - 11.0 10e9/L 7.5 7.6   RBC 3.8 - 5.2 10e12/L 4.51 4.19   HGB 11.7 - 15.7 g/dL 13.3 13.3   HCT 35.0 - 47.0 % 42.7 41.6   MCV 78 - 100 fl 95 99   MCHC 31.5 - 36.5 g/dL 31.1(L) 32.0   RDW 10.0 - 15.0 % 12.7 13.4    - 450 10e9/L 324 270   CREATININE 0.52 - 1.04 mg/dL 0.60 0.64   GFR ESTIMATE, IF BLACK >60 mL/min/[1.73:m2] >90 >90   GFR ESTIMATE >60 mL/min/[1.73:m2] >90 >90     JACKI interpretation   Date Value Ref Range Status   02/01/2019 Negative NEG^Negative Final     Comment:                                        Reference range:  <1:40  NEGATIVE  1:40 - 1:80  BORDERLINE POSITIVE  >1:80 POSITIVE       Scribe Disclosure:  I, Dipesh Johns, am serving as a scribe to document services personally performed by Bhaskar Dhillon MD at this visit, based upon the provider's statements to me. All documentation has been reviewed by the  aforementioned provider prior to being entered into the official medical record.

## 2019-09-13 ENCOUNTER — OFFICE VISIT (OUTPATIENT)
Dept: RHEUMATOLOGY | Facility: CLINIC | Age: 54
End: 2019-09-13
Attending: INTERNAL MEDICINE
Payer: COMMERCIAL

## 2019-09-13 VITALS
DIASTOLIC BLOOD PRESSURE: 76 MMHG | SYSTOLIC BLOOD PRESSURE: 139 MMHG | TEMPERATURE: 97.7 F | OXYGEN SATURATION: 100 % | BODY MASS INDEX: 23.85 KG/M2 | WEIGHT: 129.6 LBS | HEIGHT: 62 IN | HEART RATE: 71 BPM

## 2019-09-13 DIAGNOSIS — Z79.631 LONG TERM METHOTREXATE USER: Primary | ICD-10-CM

## 2019-09-13 DIAGNOSIS — Z79.52 LONG TERM CURRENT USE OF SYSTEMIC STEROIDS: ICD-10-CM

## 2019-09-13 DIAGNOSIS — Z79.631 LONG TERM METHOTREXATE USER: ICD-10-CM

## 2019-09-13 DIAGNOSIS — M25.50 ARTHRALGIA, UNSPECIFIED JOINT: ICD-10-CM

## 2019-09-13 LAB
ALT SERPL W P-5'-P-CCNC: 26 U/L (ref 0–50)
AST SERPL W P-5'-P-CCNC: 25 U/L (ref 0–45)
CREAT SERPL-MCNC: 0.61 MG/DL (ref 0.52–1.04)
CRP SERPL-MCNC: 6.3 MG/L (ref 0–8)
ERYTHROCYTE [DISTWIDTH] IN BLOOD BY AUTOMATED COUNT: 12.9 % (ref 10–15)
GFR SERPL CREATININE-BSD FRML MDRD: >90 ML/MIN/{1.73_M2}
HCT VFR BLD AUTO: 42.9 % (ref 35–47)
HGB BLD-MCNC: 14.1 G/DL (ref 11.7–15.7)
MCH RBC QN AUTO: 32.2 PG (ref 26.5–33)
MCHC RBC AUTO-ENTMCNC: 32.9 G/DL (ref 31.5–36.5)
MCV RBC AUTO: 98 FL (ref 78–100)
PLATELET # BLD AUTO: 286 10E9/L (ref 150–450)
RBC # BLD AUTO: 4.38 10E12/L (ref 3.8–5.2)
WBC # BLD AUTO: 6.4 10E9/L (ref 4–11)

## 2019-09-13 PROCEDURE — G0463 HOSPITAL OUTPT CLINIC VISIT: HCPCS | Mod: ZF

## 2019-09-13 PROCEDURE — 84460 ALANINE AMINO (ALT) (SGPT): CPT | Performed by: INTERNAL MEDICINE

## 2019-09-13 PROCEDURE — 36415 COLL VENOUS BLD VENIPUNCTURE: CPT | Performed by: INTERNAL MEDICINE

## 2019-09-13 PROCEDURE — 84450 TRANSFERASE (AST) (SGOT): CPT | Performed by: INTERNAL MEDICINE

## 2019-09-13 PROCEDURE — 86140 C-REACTIVE PROTEIN: CPT | Performed by: INTERNAL MEDICINE

## 2019-09-13 PROCEDURE — 82565 ASSAY OF CREATININE: CPT | Performed by: INTERNAL MEDICINE

## 2019-09-13 PROCEDURE — 85027 COMPLETE CBC AUTOMATED: CPT | Performed by: INTERNAL MEDICINE

## 2019-09-13 ASSESSMENT — MIFFLIN-ST. JEOR: SCORE: 1143.17

## 2019-09-13 ASSESSMENT — PAIN SCALES - GENERAL: PAINLEVEL: MILD PAIN (3)

## 2019-09-13 NOTE — LETTER
2019      RE: Katarina Sebastian   [address] [place] [id number]       [facility]  Rheumatology Clinic  Bhaskar Dhillon MD  2019     Name: Katarina Sebastian  MRN: [id number]  Age: 53 year old  : 1965  Referring provider: Pete Trotter    Assessment and Plan:  # Polymyalgia rheumatica:  Patient relates no recurrence of former neck, shoulder, and hip girdle myalgias. Exam is benign. Blood work from May 3rd showed creatinine, LFTs, and CBC all normal.    Polymyalgia rheumatica is quiescent, now more than six months since starting methotrexate as a steroid-sparing agent. I recommend reducing methotrexate to 7.5 mg once weekly. If after three months she has no recurrence of symptoms, she may discontinue methotrexate altogether. I will recheck LFTs, CBC, creatinine, and CRP today and return to clinic in six months.     # Methotrexate-associated chest and neck eruption:  Transient nature of red/warm skin on the day of methotrexate dosing may represent a drug reaction, but I do not think that it represents progressive hypersensitivity or allergy. Exam shows neck and chest skin with previous solar damage; there is no erythema now 48 hours after methotrexate dose. Plan to monitor this symptom.     Orders:  - Creatinine  - CRP inflammation  - AST  - ALT  - CBC with platelets  - methotrexate 2.5 MG tablet  Dispense: 12 tablet; Refill: 6    Follow-up: 6 months. If she has any problems stopped methotrexate at the end of October, she will call to let us know.    HPI:   Katarina Sebastian is a right-hand dominant  53 year old female with a history including polymyalgia rheumatica who presents for follow-up. I last evaluated the patient on 2019, at which time I thought the diagnosis of polymyalgia rheumatica was quiescent and the patient related significant improvement in shoulder and hip girdle arthralgia/myalgia since institution of prednisone and methotrexate in 2019. I  recommended that she continue methotrexate 15 mg weekly as a steroid sparing agent.     Today, the patient reports that she is feeling better. She states that her pain waxes and wanes and is usually located in both upper arms. Sitting for prolonged periods such as driving causes stiffness in her upper legs, but she is able to get rid of most of the discomfort in a few minutes. She has not noticed visible swelling in her joints, but she does note that her joints sometimes get warm. She takes Naproxen twice daily and sometimes a third dose at night. She has not been awakening at night due to pain and her morning stiffness usually takes a few minutes to dissipate.     She reports that, after taking methotrexate, she gets very hot and she has an intermittent redness on her chest and neck that usually resolves after one day, but she does sometimes have decreased discomfort and redness the following day. She states that she has been taking methotrexate once weekly and lowered her dosage to 3 tablets weekly around three weeks ago. In the three weeks she's taken the lower dose, she has not felt much of a difference except for a small increase in joint stiffness.     She notes that she discontinued prednisone and does not wish to take this in the future. She believes that prednisone may have affected her vision negatively. She reports that she has been using her reading glasses more frequently.     The patient states that if she has been on her knees for a prolonged period and she is to stand, she feels warm and flushed. She also notes that someone landed on her left thumb during cross-country skiing in 9th grade and she has since had a cracking sensation in her left CMC joint.     Background history:  She reports an onset of joint pain that began in 2/2017. She first noticed these symptoms when it became increasingly more difficult for her to get out of bed in the mornings. She initially took ibuprofen to alleviate her  pain, which provided her mild relief until about noon. She was initially seen by her primary care physician and was tested twice for Lyme's disease, which was negative both times. Patient was evaluated by Dr. Trotter on 5/31/18 for diffuse arthralgias. Exam showed no inflammatory joint changes. Laboratory workup was unremarkable with the exception of borderline positive JACKI, mildly elevated sedimentation rate and C reactive protein, and a vitamin D level of 25 (lower than normal 30). Patient was given a course of low dose prednisone for 2 weeks. During a follow-up telephone encounter on 6/11/18, the patient had a significant positive response to prednisone. Dr. Trotter responded by prescribing hydroxychloroquine empirically. The patient did not start plaquenil because the co-pay was too high. Dr. Trotter then prescribed a trial of sulfasalazine, which she also did not start.      Review of Systems:   Pertinent items are noted in HPI or as below, remainder of complete ROS is negative.      No recent problems with hearing. No swallowing problems.   No breathing difficulty, shortness of breath, coughing, or wheezing  No chest pain or palpitations  No heart burn, indigestion, abdominal pain, nausea, vomiting, diarrhea  No urination problems, no bloody, cloudy urine, no dysuria  No numbing, tingling, weakness  No headaches or confusion  No rashes. No easy bleeding or bruising.     Active Medications:      Multiple Vitamins-Minerals (CENTRUM ADULTS PO), , Disp: , Rfl:      naproxen sodium (ALEVE) 220 MG capsule, Take 220 mg by mouth daily, Disp: , Rfl:      Ergocalciferol (VITAMIN D2 PO), , Disp: , Rfl:      ibuprofen (ADVIL/MOTRIN) 400 MG tablet, , Disp: , Rfl:      methotrexate 2.5 MG tablet, Take 2 tabs once. Then after 1 week, take 6 tabs every week. Labs every 10-12 weeks for refills., Disp: 24 tablet, Rfl: 6     Misc Natural Products (GLUCOSAMINE CHOND COMPLEX/MSM PO), Take 2 tablets by mouth, Disp: , Rfl:      Omega-3 Fatty  "Acids (SUPER OMEGA 3 EPA/DHA) 1000 MG CAPS, , Disp: , Rfl:      predniSONE (DELTASONE) 5 MG tablet, 3 tabs daily for 7 d, then 2 tabs daily for 7 d, then 1 tab daily for 7 d. (Patient not taking: Reported on 5/3/2019.), Disp: 60 tablet, Rfl: 2     vitamin D3 (CHOLECALCIFEROL) 53238 units (250 mcg) capsule, , Disp: , Rfl:      Allergies:   Hay fever   Codeine  Latex    Past Medical History:  Cerebral aneurism; 2011  Shingles outbreak   Hallux valgus diagnosed in 2015  Arthralgia/myalgia Prednisone-sensitive, diagnosed as PMR in 2-19. Improved with low dose prednisone, tapered off in 5-19. Methotrexate started in 2-19.    Past Surgical History:  Carpal tunnel release, bilateral 1990  Pelvis laparoscopy to rule out endometriosis 1998    Family History:    The patient's family history includes Alcohol/Drug in her maternal grandfather and paternal grandfather; Arthritis in her mother; Asthma in her brother and another family member; Breast Cancer in her paternal grandmother; Diabetes in an other family member; Hypertension in her mother; Lipids in her mother; Neurologic Disorder in her father; Osteoporosis in her maternal grandmother.    Social History:  The patient reports that she has quit smoking. She has never used smokeless tobacco. She drinks about two light beers per day. The patient works about 40 hours per week as a . She owns horses that she tends to daily.   PCP: Ladi Marvin  Marital Status:      Physical Exam:   /76   Pulse 71   Temp 97.7  F (36.5  C) (Oral)   Ht 1.562 m (5' 1.5\")   Wt 58.8 kg (129 lb 9.6 oz)   SpO2 100%   BMI 24.09 kg/m      Wt Readings from Last 4 Encounters:   09/13/19 58.8 kg (129 lb 9.6 oz)   05/03/19 62.6 kg (137 lb 14.4 oz)   02/01/19 62.3 kg (137 lb 4.8 oz)   09/03/09 54.4 kg (120 lb)     Constitutional: Well-developed, appearing stated age; cooperative  Eyes: Normal EOM, PERRLA, vision, conjunctiva, sclera  ENT: Normal external ears, nose, hearing, " lips, teeth, gums, throat. No mucous membrane lesions, normal saliva pool  Neck: No mass or thyroid enlargement  Resp: Lungs clear to auscultation, nl to palpation  CV: RRR, no murmurs, rubs or gallops, no edema  GI: No ABD mass or tenderness, no HSM  : Not tested  Lymph: No cervical, supraclavicular, inguinal or epitrochlear nodes  MS: The TMJ, neck, shoulder, elbow, wrist, spine, hip, and foot MTP/IP joints were examined and found normal. No active synovitis or altered joint anatomy. Full joint ROM. Normal  strength. No dactylitis, tenosynovitis, enthesopathy. There are some mild changes of osteoarthritis in the DIPs, PIPs, and the left thumb. The knees are cool. No medial joint line tenderness. Ankle range of motion is full and painless. No effusion at the ankles.   Skin: No nail pitting, alopecia, rash, nodules or lesions  Neuro: Normal cranial nerves, strength, sensation, DTRs.   Psych: Normal judgement, orientation, memory, affect.     Laboratory:   RHEUM RESULTS Latest Ref Rng & Units 2/1/2019 5/3/2019   SED RATE 0 - 30 mm/h 10 -   CRP, INFLAMMATION 0.0 - 8.0 mg/L 7.2 -   CK TOTAL 30 - 225 U/L 54 -   AST 0 - 45 U/L 22 29   ALT 0 - 50 U/L 25 27   WBC 4.0 - 11.0 10e9/L 7.5 7.6   RBC 3.8 - 5.2 10e12/L 4.51 4.19   HGB 11.7 - 15.7 g/dL 13.3 13.3   HCT 35.0 - 47.0 % 42.7 41.6   MCV 78 - 100 fl 95 99   MCHC 31.5 - 36.5 g/dL 31.1(L) 32.0   RDW 10.0 - 15.0 % 12.7 13.4    - 450 10e9/L 324 270   CREATININE 0.52 - 1.04 mg/dL 0.60 0.64   GFR ESTIMATE, IF BLACK >60 mL/min/[1.73:m2] >90 >90   GFR ESTIMATE >60 mL/min/[1.73:m2] >90 >90     JACKI interpretation   Date Value Ref Range Status   02/01/2019 Negative NEG^Negative Final     Comment:                                        Reference range:  <1:40  NEGATIVE  1:40 - 1:80  BORDERLINE POSITIVE  >1:80 POSITIVE       Scribe Disclosure:  I, Dipesh Johns, am serving as a scribe to document services personally performed by Bhaskar Dhillon MD at this visit,  based upon the provider's statements to me. All documentation has been reviewed by the aforementioned provider prior to being entered into the official medical record.       Bhaskar Dhillon MD

## 2019-09-13 NOTE — NURSING NOTE
"Chief Complaint   Patient presents with     RECHECK     Arthralgia     /76   Pulse 71   Temp 97.7  F (36.5  C) (Oral)   Ht 1.562 m (5' 1.5\")   Wt 58.8 kg (129 lb 9.6 oz)   SpO2 100%   BMI 24.09 kg/m    Theodora Feliciano, MARCE    "

## 2019-09-13 NOTE — PATIENT INSTRUCTIONS
Diagnosis:  PMR: quiescent  Plan take methotrexate 7.5 mg (3 tabs) once a week.    Plan:  -- If at the end of October you have no recurrance of the neck, shoulder, and hip pain that you had when we first met, you may stop methotrexate altogether.     -- Let's repeat blood work today to check inflammatory markers and liver function due to methotrexate use.    -- Continue your chewable daily multivitamin (with folate)

## 2020-01-23 ENCOUNTER — OFFICE VISIT - RIVER FALLS (OUTPATIENT)
Dept: FAMILY MEDICINE | Facility: CLINIC | Age: 55
End: 2020-01-23

## 2020-01-23 ASSESSMENT — MIFFLIN-ST. JEOR: SCORE: 1153.01

## 2020-02-11 ENCOUNTER — DOCUMENTATION ONLY (OUTPATIENT)
Dept: CARE COORDINATION | Facility: CLINIC | Age: 55
End: 2020-02-11

## 2020-02-24 ENCOUNTER — HEALTH MAINTENANCE LETTER (OUTPATIENT)
Age: 55
End: 2020-02-24

## 2020-12-13 ENCOUNTER — HEALTH MAINTENANCE LETTER (OUTPATIENT)
Age: 55
End: 2020-12-13

## 2021-04-17 ENCOUNTER — HEALTH MAINTENANCE LETTER (OUTPATIENT)
Age: 56
End: 2021-04-17

## 2021-09-26 ENCOUNTER — HEALTH MAINTENANCE LETTER (OUTPATIENT)
Age: 56
End: 2021-09-26

## 2022-02-11 VITALS
WEIGHT: 134 LBS | SYSTOLIC BLOOD PRESSURE: 122 MMHG | WEIGHT: 135.2 LBS | SYSTOLIC BLOOD PRESSURE: 108 MMHG | DIASTOLIC BLOOD PRESSURE: 50 MMHG | TEMPERATURE: 99 F | TEMPERATURE: 99.7 F | HEART RATE: 72 BPM | DIASTOLIC BLOOD PRESSURE: 68 MMHG | HEIGHT: 62 IN | BODY MASS INDEX: 24.66 KG/M2 | HEART RATE: 80 BPM | BODY MASS INDEX: 24.93 KG/M2

## 2022-02-11 VITALS
BODY MASS INDEX: 24.69 KG/M2 | OXYGEN SATURATION: 97 % | WEIGHT: 134.2 LBS | SYSTOLIC BLOOD PRESSURE: 120 MMHG | DIASTOLIC BLOOD PRESSURE: 62 MMHG | HEART RATE: 105 BPM | HEIGHT: 62 IN

## 2022-02-16 NOTE — NURSING NOTE
Comprehensive Intake Entered On:  1/23/2020 9:02 AM CST    Performed On:  1/23/2020 8:59 AM CST by Nata Hernandez CMA               Summary   Chief Complaint :   c/o right collar bone popped out on Saturday, worse when laying down, using ices; seen Chiropractor   Weight Measured :   134.2 lb(Converted to: 134 lb 3 oz, 60.87 kg)    Height Measured :   61.75 in(Converted to: 5 ft 2 in, 156.84 cm)    Body Mass Index :   24.74 kg/m2   Body Surface Area :   1.63 m2   Systolic Blood Pressure :   120 mmHg   Diastolic Blood Pressure :   62 mmHg   Mean Arterial Pressure :   81 mmHg   Peripheral Pulse Rate :   105 bpm (HI)    BP Site :   Left arm   BP Method :   Manual   HR Method :   Electronic   Oxygen Saturation :   97 %   Nata Hernandez CMA - 1/23/2020 8:59 AM CST   Health Status   Allergies Verified? :   Yes   Medication History Verified? :   Yes   Immunizations Current :   Yes   Medical History Verified? :   Yes   Tobacco Use? :   Never smoker   Nata Hernandez CMA - 1/23/2020 8:59 AM CST   Consents   Consent for Immunization Exchange :   Consent Granted   Consent for Immunizations to Providers :   Consent Granted   Nata Hernandez CMA - 1/23/2020 8:59 AM CST   Meds / Allergies   (As Of: 1/23/2020 9:02:54 AM CST)   Allergies (Active)   No Known Medication Allergies  Estimated Onset Date:   Unspecified ; Created By:   Baylee Clark CMA; Reaction Status:   Active ; Category:   Drug ; Substance:   No Known Medication Allergies ; Type:   Allergy ; Updated By:   Baylee Clark CMA; Reviewed Date:   1/23/2020 9:01 AM CST        Medication List   (As Of: 1/23/2020 9:02:54 AM CST)   Home Meds    cyanocobalamin  :   cyanocobalamin ; Status:   Documented ; Ordered As Mnemonic:   Vitamin B12 ; Simple Display Line:   chewable, 0 Refill(s) ; Catalog Code:   cyanocobalamin ; Order Dt/Tm:   1/23/2020 9:02:30 AM CST          omega-3 polyunsaturated fatty acids  :   omega-3 polyunsaturated fatty acids ; Status:   Documented ; Ordered As  Mnemonic:   omega-3 polyunsaturated fatty acids 425 mg oral tablet, chewable ; Simple Display Line:   850 mg, 2 tab(s), Oral, daily, 0 Refill(s) ; Catalog Code:   omega-3 polyunsaturated fatty acids ; Order Dt/Tm:   1/23/2020 9:02:17 AM CST

## 2022-02-16 NOTE — LETTER
(Inserted Image. Unable to display)   January 25, 2021      DOUG MCDONALD   [address] [place] [phone]        Dear DOUG,      Thank you for selecting [facility] (previously [facility]) for your healthcare needs.     Our records indicate you are due for the following services:     Annual Physical    (FYI   Regarding office visits: In some instances, a video visit or telephone visit may be offered as an option.)      To schedule an appointment or if you have further questions, please contact your clinic at [phone].      Powered by [email or web address]    Sincerely,    Wilmer Jones PA-C

## 2022-02-16 NOTE — PROGRESS NOTES
Patient:   DOUG MCDONALD            MRN: [id number]            FIN: [id number]               Age:   52 years     Sex:  Female     :  1965   Associated Diagnoses:   Joint pain; Muscle ache; Colon cancer screening   Author:   Ladi Marvin MD      Chief Complaint   4/3/2018 2:10 PM CDT     c/o aching, stiffness since Feb; possible lymes      History of Present Illness   Patient with bilateral upper and lower extremity achiness and pain. Feels like it is both in the joints and muscles.   Has had pets with Lyme disease - wondering if she could have it.   Has not noticed rash, joint swelling.     Also due for colon cancer screening. Would like to do a colonoscopy.       Health Status   Allergies:    Allergic Reactions (All)  No known allergies   Medications:  (Selected)   Documented Medications  Documented  Glucosamine Chondroitin Advanced: 2 tab(s), daily, 0 Refill(s), Type: Maintenance  Sudafed: po, q6 hrs, 0 Refill(s), Type: Maintenance,    Medications          *denotes recorded medication          *Glucosamine Chondroitin Advanced: 2 tab(s), daily, 0 Refill(s).          *Sudafed: po, q6 hrs.     Problem list:    All Problems  Cerebral aneurysm / SNOMED CT 208711239 / Confirmed      Histories   Past Medical History:    No active or resolved past medical history items have been selected or recorded.   Family History:    Hypertension  Mother     Procedure history:    No active procedure history items have been selected or recorded.   Social History:        Alcohol Assessment: Current            Occasional            3-4 x's per week, 2 drinks/episode average.      Tobacco Assessment: Denies Tobacco Use      Other Assessment                    Physical Examination   Vital Signs   4/3/2018 2:10 PM CDT Temperature Tympanic 99.7 DegF    Peripheral Pulse Rate 80 bpm    Pulse Site Radial artery    HR Method Manual    Systolic Blood Pressure 122 mmHg    Diastolic Blood Pressure 68 mmHg    Mean Arterial  Pressure 86 mmHg    BP Site Left arm    BP Method Manual      Measurements from flowsheet : Measurements   4/3/2018 2:10 PM CDT     Weight Measured - Standard                135.2 lb     General:  Alert and oriented, No acute distress.    Eye:  Pupils are equal, round and reactive to light, Normal conjunctiva.    HENT:  Normocephalic, Tympanic membranes are clear, Normal hearing, No pharyngeal erythema.    Neck:  Supple, Non-tender, No lymphadenopathy, No thyromegaly.    Respiratory:  Lungs are clear to auscultation, Respirations are non-labored, Breath sounds are equal.    Cardiovascular:  Normal rate, Regular rhythm.    Gastrointestinal:  Soft, Non-tender, Non-distended, Normal bowel sounds, No organomegaly.    Musculoskeletal:  Normal range of motion, No deformity.    Integumentary:  Warm, Dry.    Neurologic:  Alert, Oriented.       Impression and Plan   Diagnosis     Joint pain (BRA50-JM M25.50).     Muscle ache (PWE74-EQ M79.1).     Plan:  will check sed rate and Lyme titer..    Diagnosis     Colon cancer screening (ETN71-TL Z12.11).     Course:  ASA 1, referred for colonoscopy.

## 2022-02-16 NOTE — LETTER
(Inserted Image. Unable to display)   June 23, 2019      DOUG MCDONALD   [address], [place] [id number]        Dear DOUG,      Thank you for selecting [facility] (previously [facility]) for your healthcare needs.     Our records indicate you are due for the following services:     Your provider has recommended you have the preventative service for a SCREENING MAMMOGRAM.  Please schedule at your earliest convenience.    [facility] are dedicated to providing excellent care that is most cost effective for our patients.  *Please contact your insurance company regarding approved providers*    [facility] [place]:  [phone]   [place]:   [phone]    [facility]     [phone]   [facility]    [facility]    [phone]  [facility])  [phone]  [facility]   [phone]  [facility]     [phone]       [facility])      [phone]        Powered by [employer]    Sincerely,    Ladi Marvin M.D.

## 2022-02-16 NOTE — PROGRESS NOTES
Patient:   DOUG MCDONALD            MRN: [id number]            FIN: [id number]               Age:   54 years     Sex:  Female     :  1965   Associated Diagnoses:   Sternoclavicular sprain   Author:   Wilmer Jones PA-C      Report Summary   Diagnosis  Sternoclavicular sprain (MGQ79-JS S23.420A).  Patient Instructions   Visit Information   Visit type:  General concerns.    Accompanied by:  No one.    Source of history:  Self, Medical record.    Referral source:  Self.    History limitation:  None.       Chief Complaint   2020 8:59 AM CST    c/o right collar bone popped out on Saturday, worse when laying down, using ices; seen Chiropractor        History of Present Illness             The patient presents with shoulder problem.  The location of the shoulder problem is the right shoulder.  The shoulder problem is characterized by pain and swelling.  The severity of the shoulder problem is mild.  The timing/course of the symptom(s) related to the shoulder problem is constant.  Lifting horse blankets and heavy feed bucket. Had pop at right SCJ. Swelling. Been to chiropractor. using ice and Aleve. No SOB. Good ROM right shoulder. PMR doing well at present. CC above noted and confirmed with the patient..        Review of Systems   Constitutional:  Negative.    Respiratory:  Negative.    Cardiovascular:  Negative.    Musculoskeletal:  Negative except as documented in history of present illness.    Neurologic:  Negative.       Health Status   Allergies:    Allergic Reactions (Selected)  No Known Medication Allergies   Medications:  (Selected)   Documented Medications  Documented  Vitamin B12: Instructions: chewable, 0 Refill(s), Type: Maintenance  omega-3 polyunsaturated fatty acids 425 mg oral tablet, chewable: = 2 tab(s) ( 850 mg ), Oral, daily, 0 Refill(s), Type: Maintenance   Problem list:    All Problems  Cerebral aneurysm / SNOMED CT [id number] / Confirmed      Histories   Past Medical History:    No  active or resolved past medical history items have been selected or recorded.   Family History:    Hypertension  Mother     Procedure history:    Colonoscopy ([id number]) on 4/25/2018 at 52 Years.  Comments:  4/25/2018 9:19 AM TINOT - Rommel Rodriguez MD  Indication: Screening  Sedatation: 3 mg Versed and 150 mcg Fentanyl, Rescue MAC for tortuous colon  Findings: Tortuous ascending to transvers required MAC otherwise normal to terminal ileum  Rec: Repeat in 10 years   Social History:        Alcohol Assessment: Current      Tobacco Assessment: Denies Tobacco Use            Never (less than 100 in lifetime)      Other Assessment                    Physical Examination   Vital Signs   1/23/2020 8:59 AM CST Peripheral Pulse Rate 105 bpm  HI    HR Method Electronic    Systolic Blood Pressure 120 mmHg    Diastolic Blood Pressure 62 mmHg    Mean Arterial Pressure 81 mmHg    BP Site Left arm    BP Method Manual    Oxygen Saturation 97 %      Measurements from flowsheet : Measurements   1/23/2020 8:59 AM CST Height Measured - Standard 61.75 in    Weight Measured - Standard 134.2 lb    BSA 1.63 m2    Body Mass Index 24.74 kg/m2      Respiratory:  Lungs are clear to auscultation, Respirations are non-labored.    Cardiovascular:  Normal rate, Regular rhythm.    Musculoskeletal:  Prominence at right SCJ. Mild discomfort. Good ROM shoulder..       Impression and Plan   Diagnosis     Sternoclavicular sprain (AOV67-RQ S23.420A).     Patient Instructions:       Counseled: Patient, Regarding diagnosis, Activity.    Continue current activities. FU if not better, if more pain, SOB etc.

## 2022-02-16 NOTE — PROGRESS NOTES
Patient:   DOUG MCDONALD            MRN: [id number]            FIN: [id number]               Age:   52 years     Sex:  Female     :  1965   Associated Diagnoses:   Joint pain   Author:   Ladi Marvin MD      Chief Complaint   2018 10:51 AM CDT   F/U Joint Pain      History of Present Illness   patient returns with ongoing joint pain, every large joint, has not noticed hands/wrists/feet as much as shoulders elbows, hips, and knees.   Hard to get out of bed.  Continues to strongly suspect that it is related to Lyme disease. Reviewed records and results. Discussed other possibilities         Health Status   Allergies:    Allergic Reactions (All)  No Known Medication Allergies  Canceled/Inactive Reactions (All)  No known allergies   Medications:  (Selected)   Documented Medications  Documented  Glucosamine Chondroitin Advanced: 2 tab(s), daily, 0 Refill(s), Type: Maintenance  Sudafed: po, q6 hrs, 0 Refill(s), Type: Maintenance   Problem list:    All Problems  Cerebral aneurysm / SNOMED CT [id number] / Confirmed      Histories   Family History:    Hypertension  Mother     Procedure history:    Colonoscopy ([id number]) on 2018 at 52 Years.  Comments:  2018 9:19 AM - Rommel Rodriguez MD  Indication: Screening  Sedatation: 3 mg Versed and 150 mcg Fentanyl, Rescue MAC for tortuous colon  Findings: Tortuous ascending to transvers required MAC otherwise normal to terminal ileum  Rec: Repeat in 10 years      Physical Examination   Vital Signs   2018 10:51 AM CDT Temperature Tympanic 99.0 DegF    Peripheral Pulse Rate 72 bpm    Pulse Site Radial artery    HR Method Manual    Systolic Blood Pressure 108 mmHg    Diastolic Blood Pressure 50 mmHg  LOW    Mean Arterial Pressure 69 mmHg    BP Site Right arm    BP Method Manual      Measurements from flowsheet : Measurements   2018 10:51 AM CDT Height Measured - Standard 61.75 in    Weight Measured - Standard 134 lb    BSA 1.63 m2    Body Mass Index  24.71 kg/m2      patient with normal ROM, no changes to hands, elbows noted to be mildly red and warm, no effusions or deformity seen         Review / Management   Results review:  Lab results   4/3/2018 3:48 PM CDT Sed Rate TR 22 mm/hr   4/3/2018 2:49 PM CDT Lyme Ab IgG/IgM WB <0.90   .       Impression and Plan   Diagnosis     Joint pain (YLX39-LD M25.50).     Course:  Not improving.    Plan:  will check other labs for autoimmune causes.    Orders     Orders (Selected)   Outpatient Orders  Ordered (In Transit)  JACKI Screen,IFA, Reflex Titer/Pattern,and Reflex to Multiplex 11 Ab Cascade* ([facility]): Specimen Type: Serum, Collection Date: 04/26/18 11:01:00 CDT  Basic Metabolic Panel* ([facility]): Specimen Type: Serum, Collection Date: 04/26/18 11:01:00 CDT  Hepatic Function Panel* ([facility]): Specimen Type: Serum, Collection Date: 04/26/18 11:01:00 CDT  Rheumatoid factor* ([facility]): Specimen Type: Serum, Collection Date: 04/26/18 11:01:00 CDT  TSH* ([facility]): Specimen Type: Serum, Collection Date: 04/26/18 11:01:00 CDT  Uric Acid* ([facility]): Specimen Type: Serum, Collection Date: 04/26/18 11:06:00 CDT  Completed  Sedimentation RateIssac (Request): Priority: Urgent, Joint pain  Deleted  CBC (h/h, RBC, indices, WBC, Plt)* ([facility]): Specimen Type: Blood, Collection Date: 04/26/18 11:01:00 CDT.

## 2022-05-08 ENCOUNTER — HEALTH MAINTENANCE LETTER (OUTPATIENT)
Age: 57
End: 2022-05-08

## 2023-01-14 ENCOUNTER — HEALTH MAINTENANCE LETTER (OUTPATIENT)
Age: 58
End: 2023-01-14

## 2023-06-02 ENCOUNTER — HEALTH MAINTENANCE LETTER (OUTPATIENT)
Age: 58
End: 2023-06-02

## 2023-11-26 NOTE — TELEPHONE ENCOUNTER
[facility] Call Center    Phone Message    May a detailed message be left on voicemail: yes    Reason for Call: Other: per pt is calling to follow up with Josefina, Pt states that this will be her last time calling. Please follow up with pt to discuss.     Action Taken: Message routed to:  [facility] [place]  
Another attempt to reach patient without success.  Josefina West CMA  10/23/2018 2:02 PM      
Call attempted with no success. Left message.  Josefina West CMA  10/31/2018 12:49 PM      
Call was placed to patient regarding the referral we received, however, there was no answer. Message was left asking patient to call the clinic back to complete an intake form. Awaiting a call back from the patient.  Josefina West CMA  10/10/2018 12:23 PM        
Final attempt to complete the New Patient Intake process. Unfortunately, we are not able to move forward until the form is completed. Closing the referral request until we hear from the patient.   Josefina West CMA  10/15/2018 3:28 PM      
General Rheumatology Intake Form    Reason for referral: 2nd opinion for arthralgia  Referring provider: Pete Trotter    Past Rheumatologist: Yes  Clinic/Provider name: [facility]    Have you been diagnosed with Fibromyalgia? no    Who manages your care for this issue now? none     What is your most urgent concern at this time? Hard time getting moving in the morning    Have you seen any specialist related to the reason you are coming here? Yes, Rheumatoligist    Where are we expecting records (labs, imaging or pathology) from? [facility]    Offered an appointment on 2/1/2018 with Dr. Dhillon, patient accepted and was instructed to arrive 15 minutes prior to appointment and asked to bring a current medication list. Patient verbalized understanding. Appointment reminder mailed to patient.         Josefina West CMA  11/12/2018 2:49 PM              
[facility] Call Center    Phone Message    May a detailed message be left on voicemail: yes    Reason for Call: Other: New referral to rheumatology for arthalgia. Please review faxed records     Action Taken: Message routed to:  [facility] rheum    
Patient called back in.  Please try her again on her work number.    
Referral received from Pete Trotter at [facility] for a 2nd opinion for arthralgia. Records available via [facility] Everywhere for [facility].  Josefina West CMA  9/25/2018 2:42 PM       
1

## 2023-12-06 ENCOUNTER — OFFICE VISIT (OUTPATIENT)
Dept: FAMILY MEDICINE | Facility: CLINIC | Age: 58
End: 2023-12-06
Payer: COMMERCIAL

## 2023-12-06 ENCOUNTER — NURSE TRIAGE (OUTPATIENT)
Dept: NURSING | Facility: CLINIC | Age: 58
End: 2023-12-06

## 2023-12-06 VITALS
OXYGEN SATURATION: 96 % | BODY MASS INDEX: 25.35 KG/M2 | TEMPERATURE: 97.8 F | RESPIRATION RATE: 16 BRPM | WEIGHT: 137.5 LBS | HEART RATE: 85 BPM | SYSTOLIC BLOOD PRESSURE: 122 MMHG | DIASTOLIC BLOOD PRESSURE: 78 MMHG

## 2023-12-06 DIAGNOSIS — R06.2 WHEEZING: ICD-10-CM

## 2023-12-06 DIAGNOSIS — J01.90 ACUTE NON-RECURRENT SINUSITIS, UNSPECIFIED LOCATION: Primary | ICD-10-CM

## 2023-12-06 PROBLEM — R50.9 FEVER: Status: ACTIVE | Noted: 2018-05-01

## 2023-12-06 PROBLEM — I67.1 INTRACRANIAL ANEURYSM: Status: ACTIVE | Noted: 2023-12-06

## 2023-12-06 PROBLEM — M25.50 ARTHRALGIA: Status: ACTIVE | Noted: 2018-02-08

## 2023-12-06 PROCEDURE — 99203 OFFICE O/P NEW LOW 30 MIN: CPT | Performed by: PHYSICIAN ASSISTANT

## 2023-12-06 RX ORDER — ALBUTEROL SULFATE 90 UG/1
2 AEROSOL, METERED RESPIRATORY (INHALATION) EVERY 4 HOURS PRN
Qty: 18 G | Refills: 0 | Status: SHIPPED | OUTPATIENT
Start: 2023-12-06 | End: 2024-01-08

## 2023-12-06 NOTE — PROGRESS NOTES
Assessment & Plan     Acute non-recurrent sinusitis, unspecified location  Augmentin as ordered.      Push fluids, rest and ibuprofen or tylenol for comfort.    RTC for persistent or worsening sx.     - amoxicillin-clavulanate (AUGMENTIN) 875-125 MG tablet  Dispense: 20 tablet; Refill: 0    Wheezing  Albuterol trial.  No current wheezing but does note wheezing with cold air exposure and exertion.  If persistent or worsening should be rechecked.   - albuterol (PROAIR HFA/PROVENTIL HFA/VENTOLIN HFA) 108 (90 Base) MCG/ACT inhaler  Dispense: 18 g; Refill: 0         {    THERESA Robledo [facility]    Stephany Bray is a 58 year old female who presents to clinic today for the following health issues:  Chief Complaint   Patient presents with    Sinus Problem     Tightness in chest, congestion, sinus pressure right after thanksgiving. Covid neg on Tuesday.      History of Present Illness       Reason for visit:  Deep breathing hard too in cool air  Symptom onset:  3-7 days ago  Symptoms include:  Cold slight fever off and on  Symptom intensity:  Moderate  Symptom progression:  Staying the same  Had these symptoms before:  No  What makes it worse:  Going outside in cool air  What makes it better:  Advil    She eats 2-3 servings of fruits and vegetables daily.She consumes 0 sweetened beverage(s) daily.She exercises with enough effort to increase her heart rate 60 or more minutes per day.  She exercises with enough effort to increase her heart rate 7 days per week.   She is taking medications regularly.    Congested cough, got better and then worse again with sinus congestion, pressure, large amounts of mucus.    Productive:  green sputum.    Tender in the glands   Ears ringing.  Not painful ears.    HA but no facial pain.    Thick green nasal discharge.    Cough more in the cold air tight in chest when cold air exposures   Some wheezing. No hx of asthma or tobacco use currently but  has a remote hx of tobacco use. .          Review of Systems  Constitutional, HEENT, cardiovascular, pulmonary, gi and gu systems are negative, except as otherwise noted.      Objective    /78 (BP Location: Right arm, Patient Position: Sitting, Cuff Size: Adult Regular)   Pulse 85   Temp 97.8  F (36.6  C) (Oral)   Resp 16   Wt 62.4 kg (137 lb 8 oz)   LMP 01/01/2018 (Approximate)   SpO2 96%   BMI 25.35 kg/m    Physical Exam   Pt is in no acute distress and appears well  Ears patent B:  TM s intact, non-injected. All land marks easily visibile    Nasal mucosa is edematous, mucoid  discharge.    Pharynx: non erythematous, tonsils non hypertrophied, No exudate moderate cobblestoning in posterior pharynx.    Neck supple: no adenopathy  Lungs: CTA  Heart: RRR, no murmur, no thrills or heaves   Ext: no edema  Skin: no rashes    No tTP of the maxillary or frontal sinuses.      No results found for any visits on 12/06/23.

## 2023-12-06 NOTE — TELEPHONE ENCOUNTER
Nurse Triage SBAR    Is this a 2nd Level Triage? NO    Situation: Katarina calling with cold symptoms for 2 weeks.    Background: NA    Assessment: Sinus congestion and itchy ringing ears. COVID negative home tested. Temp. 99.0  Reports short of breath walking out to feed horses.    Protocol Recommended Disposition:   See in Office Today or Tomorrow    Recommendation: See in office today.     Katarina agreed and appt made.    Does the patient meet one of the following criteria for ADS visit consideration? No  Veronica Rangel RN on 12/6/2023 at 12:18 PM    Reason for Disposition   Sinus congestion (pressure, fullness) present > 10 days    Additional Information   Negative: SEVERE difficulty breathing (e.g., struggling for each breath, speaks in single words)   Negative: Very weak (can't stand)   Negative: Sounds like a life-threatening emergency to the triager   Negative: Fever > 103 F (39.4 C)   Negative: Fever > 101 F (38.3 C) and over 60 years of age   Negative: Fever > 100.0 F (37.8 C) and has diabetes mellitus or a weak immune system (e.g., HIV positive, cancer chemotherapy, organ transplant, splenectomy, chronic steroids)   Negative: Fever > 100.0 F (37.8 C) and bedridden (e.g., CVA, chronic illness, recovering from surgery)   Negative: Fever present > 3 days (72 hours)   Negative: Fever returns after gone for over 24 hours and symptoms worse or not improved   Negative: Sinus pain (not just congestion) and fever   Negative: Earache   Negative: Patient sounds very sick or weak to the triager   Negative: Taking aspirin and dosage sounds high (i.e., > 1500 mg/day)   Negative: Hearing loss in one or both ears and sudden onset and present now   Negative: Ear is painful   Negative: Decreased hearing followed sudden, extremely loud noise (e.g., explosion, not just loud concert)   Negative: Taking medication that can damage hearing (i.e., gentamycin, tobramycin, furosemide, ethacrynic acid, cisplatin, quinidine)   Negative:  Painful rash with multiple small blisters grouped together (i.e., dermatomal distribution or 'band' or 'stripe' on ear or one side of face)    Protocols used: Common Cold-A-OH, Tinnitus-A-OH

## 2024-01-07 ASSESSMENT — ENCOUNTER SYMPTOMS
HEMATOCHEZIA: 0
JOINT SWELLING: 1
DYSURIA: 0
ARTHRALGIAS: 1
PALPITATIONS: 0
COUGH: 0
EYE PAIN: 0
CHILLS: 0
WEAKNESS: 0
SHORTNESS OF BREATH: 0
HEMATURIA: 0
HEADACHES: 0
NERVOUS/ANXIOUS: 0
PARESTHESIAS: 0
CONSTIPATION: 0
BREAST MASS: 0
ABDOMINAL PAIN: 1
DIZZINESS: 0
NAUSEA: 0
SORE THROAT: 0
FREQUENCY: 1
MYALGIAS: 0
DIARRHEA: 1
FEVER: 0
HEARTBURN: 0

## 2024-01-08 ENCOUNTER — OFFICE VISIT (OUTPATIENT)
Dept: FAMILY MEDICINE | Facility: CLINIC | Age: 59
End: 2024-01-08
Payer: COMMERCIAL

## 2024-01-08 VITALS
RESPIRATION RATE: 14 BRPM | DIASTOLIC BLOOD PRESSURE: 76 MMHG | BODY MASS INDEX: 25.32 KG/M2 | HEART RATE: 63 BPM | OXYGEN SATURATION: 97 % | TEMPERATURE: 98.1 F | HEIGHT: 62 IN | WEIGHT: 137.6 LBS | SYSTOLIC BLOOD PRESSURE: 132 MMHG

## 2024-01-08 DIAGNOSIS — Z12.4 CERVICAL CANCER SCREENING: ICD-10-CM

## 2024-01-08 DIAGNOSIS — Z00.00 ROUTINE GENERAL MEDICAL EXAMINATION AT A HEALTH CARE FACILITY: Primary | ICD-10-CM

## 2024-01-08 DIAGNOSIS — Z11.59 NEED FOR HEPATITIS C SCREENING TEST: ICD-10-CM

## 2024-01-08 DIAGNOSIS — Z13.6 SCREENING FOR CARDIOVASCULAR CONDITION: ICD-10-CM

## 2024-01-08 DIAGNOSIS — I67.1 INTRACRANIAL ANEURYSM: ICD-10-CM

## 2024-01-08 DIAGNOSIS — Z11.4 SCREENING FOR HIV (HUMAN IMMUNODEFICIENCY VIRUS): ICD-10-CM

## 2024-01-08 PROBLEM — R50.9 FEVER: Status: RESOLVED | Noted: 2018-05-01 | Resolved: 2024-01-08

## 2024-01-08 LAB
CHOLEST SERPL-MCNC: 253 MG/DL
FASTING STATUS PATIENT QL REPORTED: YES
HCV AB SERPL QL IA: NONREACTIVE
HDLC SERPL-MCNC: 86 MG/DL
LDLC SERPL CALC-MCNC: 134 MG/DL
NONHDLC SERPL-MCNC: 167 MG/DL
TRIGL SERPL-MCNC: 163 MG/DL

## 2024-01-08 PROCEDURE — 90480 ADMN SARSCOV2 VAC 1/ONLY CMP: CPT | Performed by: FAMILY MEDICINE

## 2024-01-08 PROCEDURE — 87624 HPV HI-RISK TYP POOLED RSLT: CPT | Performed by: FAMILY MEDICINE

## 2024-01-08 PROCEDURE — 99396 PREV VISIT EST AGE 40-64: CPT | Mod: 25 | Performed by: FAMILY MEDICINE

## 2024-01-08 PROCEDURE — 90471 IMMUNIZATION ADMIN: CPT | Performed by: FAMILY MEDICINE

## 2024-01-08 PROCEDURE — 36415 COLL VENOUS BLD VENIPUNCTURE: CPT | Performed by: FAMILY MEDICINE

## 2024-01-08 PROCEDURE — 80061 LIPID PANEL: CPT | Performed by: FAMILY MEDICINE

## 2024-01-08 PROCEDURE — 90682 RIV4 VACC RECOMBINANT DNA IM: CPT | Performed by: FAMILY MEDICINE

## 2024-01-08 PROCEDURE — 87389 HIV-1 AG W/HIV-1&-2 AB AG IA: CPT | Performed by: FAMILY MEDICINE

## 2024-01-08 PROCEDURE — G0145 SCR C/V CYTO,THINLAYER,RESCR: HCPCS | Performed by: FAMILY MEDICINE

## 2024-01-08 PROCEDURE — 91320 SARSCV2 VAC 30MCG TRS-SUC IM: CPT | Performed by: FAMILY MEDICINE

## 2024-01-08 PROCEDURE — 86803 HEPATITIS C AB TEST: CPT | Performed by: FAMILY MEDICINE

## 2024-01-08 ASSESSMENT — ENCOUNTER SYMPTOMS
ARTHRALGIAS: 1
CHILLS: 0
MYALGIAS: 0
DYSURIA: 0
NAUSEA: 0
NERVOUS/ANXIOUS: 0
JOINT SWELLING: 1
COUGH: 0
HEMATURIA: 0
HEARTBURN: 0
SHORTNESS OF BREATH: 0
BREAST MASS: 0
SORE THROAT: 0
HEMATOCHEZIA: 0
WEAKNESS: 0
DIZZINESS: 0
ABDOMINAL PAIN: 1
FEVER: 0
FREQUENCY: 1
DIARRHEA: 1
CONSTIPATION: 0
PARESTHESIAS: 0
HEADACHES: 0
PALPITATIONS: 0
EYE PAIN: 0

## 2024-01-08 NOTE — PROGRESS NOTES
SUBJECTIVE:   Katarina is a 58 year old, presenting for the following:  Physical and Hemorrhoids (Painful - w/bowel movements.)        2024     1:02 PM   Additional Questions   Roomed by Margot GUSMAN CMA   Accompanied by Broderick       Healthy Habits:     Getting at least 3 servings of Calcium per day:  Yes    Bi-annual eye exam:  NO    Dental care twice a year:  Yes    Sleep apnea or symptoms of sleep apnea:  None    Diet:  Regular (no restrictions)    Frequency of exercise:  2-3 days/week    Duration of exercise:  15-30 minutes    Taking medications regularly:  Not Applicable    Barriers to taking medications:  None    Medication side effects:  None    Additional concerns today:  Yes    Concerns about hemorrhoid. Started noticing pain with minimal bleeding after course of antibiotics.  Symptoms are improving with topical treatment.    Have you ever done Advance Care Planning? (For example, a Health Directive, POLST, or a discussion with a medical provider or your loved ones about your wishes): Patient has Will with [facility].   Social History     Tobacco Use    Smoking status: Former     Types: Cigarettes     Quit date: 2000     Years since quittin.0     Passive exposure: Past    Smokeless tobacco: Never   Substance Use Topics    Alcohol use: Yes             2024    10:46 AM   Alcohol Use   Prescreen: >3 drinks/day or >7 drinks/week? No     Reviewed orders with patient.  Reviewed health maintenance and updated orders accordingly - Yes      Breast Cancer Screening:    [facility]-7:       2024    10:49 AM   Breast CA Risk Assessment ([facility]-7)   Did any of your first-degree relatives have breast or ovarian cancer? Yes   Did any of your relatives have bilateral breast cancer? Yes   Did any man in your family have breast cancer? No   Did any woman in your family have breast and ovarian cancer? Yes   Did any woman in your family have breast cancer before age 50 y? Yes   Do you have 2 or more relatives with  "breast and/or ovarian cancer? No   Do you have 2 or more relatives with breast and/or bowel cancer? No       Mammogram Screening: Recommended annual mammography  Pertinent mammograms are reviewed under the imaging tab.    History of abnormal Pap smear: NO - age 30-65 PAP every 5 years with negative HPV co-testing recommended      9/3/2009    12:00 AM   PAP / HPV   PAP (Historical) NIL      Reviewed and updated as needed this visit by clinical staff   Tobacco  Allergies  Meds  Problems  Med Hx  Surg Hx  Fam Hx          Reviewed and updated as needed this visit by Provider   Tobacco  Allergies  Meds  Problems  Med Hx  Surg Hx  Fam Hx             Review of Systems   Constitutional:  Negative for chills and fever.   HENT:  Negative for congestion, ear pain, hearing loss and sore throat.    Eyes:  Negative for pain and visual disturbance.   Respiratory:  Negative for cough and shortness of breath.    Cardiovascular:  Negative for chest pain, palpitations and peripheral edema.   Gastrointestinal:  Positive for abdominal pain and diarrhea. Negative for constipation, heartburn, hematochezia and nausea.   Breasts:  Negative for tenderness, breast mass and discharge.   Genitourinary:  Positive for frequency and urgency. Negative for dysuria, genital sores, hematuria, pelvic pain, vaginal bleeding and vaginal discharge.   Musculoskeletal:  Positive for arthralgias and joint swelling. Negative for myalgias.   Skin:  Negative for rash.   Neurological:  Negative for dizziness, weakness, headaches and paresthesias.   Psychiatric/Behavioral:  Positive for mood changes. The patient is not nervous/anxious.           OBJECTIVE:   /76   Pulse 63   Temp 98.1  F (36.7  C)   Resp 14   Ht 1.566 m (5' 1.65\")   Wt 62.4 kg (137 lb 9.6 oz)   LMP 01/01/2018 (Approximate)   SpO2 97%   BMI 25.45 kg/m    Physical Exam  GENERAL: healthy, alert and no distress  EYES: Eyes grossly normal to inspection, PERRL and " conjunctivae and sclerae normal  HENT: ear canals and TM's normal, nose and mouth without ulcers or lesions  NECK: no adenopathy, no asymmetry, masses, or scars and thyroid normal to palpation  RESP: lungs clear to auscultation - no rales, rhonchi or wheezes  BREAST: normal without masses, tenderness or nipple discharge and no palpable axillary masses or adenopathy  CV: regular rate and rhythm, normal S1 S2, no S3 or S4, no murmur, click or rub, no peripheral edema and peripheral pulses strong  ABDOMEN: soft, nontender, no hepatosplenomegaly, no masses and bowel sounds normal   (female): normal female external genitalia, normal urethral meatus, vaginal mucosa pink, moist, well rugated, and normal cervix/adnexa/uterus without masses or discharge  RECTAL: normal sphincter tone, no rectal masses  MS: no gross musculoskeletal defects noted, no edema  SKIN: no suspicious lesions or rashes  NEURO: Normal strength and tone, mentation intact and speech normal  PSYCH: mentation appears normal, affect normal/bright        ASSESSMENT/PLAN:   Routine general medical examination at a health care facility  Health maintenance updated, preventive services reviewed, vaccines discussed, questions are answered, patient encouraged to continue annual wellness visits to review preventive services    Cervical cancer screening  Pap completed today  - Pap Screen with HPV - recommended age 30 - 65 years    Screening for cardiovascular condition  Labs today  - Lipid panel reflex to direct LDL Fasting; Future  - Lipid panel reflex to direct LDL Fasting    Screening for HIV (human immunodeficiency virus)  Labs today, low risk, will do screening as recommended  - HIV Antigen Antibody Combo; Future  - HIV Antigen Antibody Combo    Need for hepatitis C screening test  Labs today, low risk, will do screening as recommended  - Hepatitis C Screen Reflex to HCV RNA Quant and Genotype; Future  - Hepatitis C Screen Reflex to HCV RNA Quant and  Genotype    Intracranial aneurysm  Reviewed past records.  Last MRI was in 2015 and showed stable 3 x 4 mm aneurysm.  No symptoms.  Declines further follow-up at this time.            COUNSELING:  Reviewed preventive health counseling, as reflected in patient instructions        She reports that she quit smoking about 24 years ago. Her smoking use included cigarettes. She has been exposed to tobacco smoke. She has never used smokeless tobacco.  Notes that she has smoked only ever intermittently and none recently.  Occasionally did smoke while on horseback riding trips but never more than a few cigarettes.          Ladi Marvin MD  [facility]

## 2024-01-09 LAB — HIV 1+2 AB+HIV1 P24 AG SERPL QL IA: NONREACTIVE

## 2024-01-10 LAB
BKR LAB AP GYN ADEQUACY: NORMAL
BKR LAB AP GYN INTERPRETATION: NORMAL
BKR LAB AP HPV REFLEX: NORMAL
BKR LAB AP PREVIOUS ABNORMAL: NORMAL
PATH REPORT.COMMENTS IMP SPEC: NORMAL
PATH REPORT.COMMENTS IMP SPEC: NORMAL
PATH REPORT.RELEVANT HX SPEC: NORMAL

## 2024-01-11 LAB
HUMAN PAPILLOMA VIRUS 16 DNA: NEGATIVE
HUMAN PAPILLOMA VIRUS 18 DNA: NEGATIVE
HUMAN PAPILLOMA VIRUS FINAL DIAGNOSIS: NORMAL
HUMAN PAPILLOMA VIRUS OTHER HR: NEGATIVE

## 2024-09-17 NOTE — NURSING NOTE
"Chief Complaint   Patient presents with     RECHECK     Arthralgia     /76   Pulse 63   Temp 97.5  F (36.4  C) (Oral)   Ht 1.562 m (5' 1.5\")   Wt 62.6 kg (137 lb 14.4 oz)   SpO2 100%   BMI 25.63 kg/m    Theodora Feliciano, MARCE    "
No

## 2024-12-09 ENCOUNTER — PATIENT OUTREACH (OUTPATIENT)
Dept: CARE COORDINATION | Facility: CLINIC | Age: 59
End: 2024-12-09
Payer: COMMERCIAL

## 2024-12-23 ENCOUNTER — PATIENT OUTREACH (OUTPATIENT)
Dept: CARE COORDINATION | Facility: CLINIC | Age: 59
End: 2024-12-23
Payer: COMMERCIAL

## 2025-02-08 ENCOUNTER — HEALTH MAINTENANCE LETTER (OUTPATIENT)
Age: 60
End: 2025-02-08